# Patient Record
Sex: FEMALE | Race: WHITE | Employment: PART TIME | URBAN - METROPOLITAN AREA
[De-identification: names, ages, dates, MRNs, and addresses within clinical notes are randomized per-mention and may not be internally consistent; named-entity substitution may affect disease eponyms.]

---

## 2017-05-03 ENCOUNTER — HOSPITAL ENCOUNTER (OUTPATIENT)
Dept: SLEEP MEDICINE | Age: 57
Discharge: HOME | End: 2017-05-03
Payer: COMMERCIAL

## 2017-05-03 PROCEDURE — 95811 POLYSOM 6/>YRS CPAP 4/> PARM: CPT

## 2017-05-17 PROBLEM — E66.9 OBESITY (BMI 30-39.9): Status: ACTIVE | Noted: 2017-05-17

## 2017-05-17 PROBLEM — G47.33 OSA (OBSTRUCTIVE SLEEP APNEA): Status: ACTIVE | Noted: 2017-05-17

## 2017-05-17 PROBLEM — Z72.821 INADEQUATE SLEEP HYGIENE: Status: ACTIVE | Noted: 2017-05-17

## 2017-11-15 PROBLEM — G47.30 SLEEP APNEA WITH USE OF CONTINUOUS POSITIVE AIRWAY PRESSURE (CPAP): Status: ACTIVE | Noted: 2017-11-15

## 2018-06-04 ENCOUNTER — HOSPITAL ENCOUNTER (OUTPATIENT)
Dept: LAB | Age: 58
Discharge: HOME | End: 2018-06-04
Attending: NURSE PRACTITIONER
Payer: COMMERCIAL

## 2018-06-04 DIAGNOSIS — R60.9 FLUID RETENTION: ICD-10-CM

## 2018-06-04 PROBLEM — E66.01 SEVERE OBESITY (BMI 35.0-39.9): Status: ACTIVE | Noted: 2018-06-04

## 2018-06-04 LAB
ALBUMIN SERPL-MCNC: 3.9 G/DL (ref 3.5–5)
ALBUMIN/GLOB SERPL: 1.3 {RATIO}
ALP SERPL-CCNC: 103 U/L (ref 50–136)
ALT SERPL-CCNC: 36 U/L (ref 12–65)
ANION GAP SERPL CALC-SCNC: 6 MMOL/L
AST SERPL-CCNC: 28 U/L (ref 15–37)
BASOPHILS # BLD: 0 K/UL (ref 0–0.2)
BASOPHILS NFR BLD: 0 % (ref 0–2)
BILIRUB SERPL-MCNC: 0.4 MG/DL (ref 0.2–1.1)
BUN SERPL-MCNC: 14 MG/DL (ref 6–23)
CALCIUM SERPL-MCNC: 9.2 MG/DL (ref 8.3–10.4)
CHLORIDE SERPL-SCNC: 107 MMOL/L (ref 98–107)
CO2 SERPL-SCNC: 28 MMOL/L (ref 21–32)
CREAT SERPL-MCNC: 0.9 MG/DL (ref 0.6–1)
DIFFERENTIAL METHOD BLD: ABNORMAL
EOSINOPHIL # BLD: 0.1 K/UL (ref 0–0.8)
EOSINOPHIL NFR BLD: 3 % (ref 0.5–7.8)
ERYTHROCYTE [DISTWIDTH] IN BLOOD BY AUTOMATED COUNT: 12.2 % (ref 11.9–14.6)
GLOBULIN SER CALC-MCNC: 2.9 G/DL
GLUCOSE SERPL-MCNC: 117 MG/DL (ref 65–100)
HCT VFR BLD AUTO: 39.3 % (ref 35.8–46.3)
HGB BLD-MCNC: 13.8 G/DL (ref 11.7–15.4)
LYMPHOCYTES # BLD: 1.7 K/UL (ref 0.5–4.6)
LYMPHOCYTES NFR BLD: 33 % (ref 13–44)
MCH RBC QN AUTO: 32.4 PG (ref 26.1–32.9)
MCHC RBC AUTO-ENTMCNC: 35.1 G/DL (ref 31.4–35)
MCV RBC AUTO: 92.3 FL (ref 79.6–97.8)
MONOCYTES # BLD: 0.5 K/UL (ref 0.1–1.3)
MONOCYTES NFR BLD: 9 % (ref 4–12)
NEUTS SEG # BLD: 2.8 K/UL (ref 1.7–8.2)
NEUTS SEG NFR BLD: 55 % (ref 43–78)
PLATELET # BLD AUTO: 235 K/UL (ref 150–450)
PMV BLD AUTO: 9.3 FL (ref 10.8–14.1)
POTASSIUM SERPL-SCNC: 4.6 MMOL/L (ref 3.5–5.1)
PROT SERPL-MCNC: 6.8 G/DL (ref 6.3–8.2)
RBC # BLD AUTO: 4.26 M/UL (ref 4.05–5.25)
SODIUM SERPL-SCNC: 141 MMOL/L (ref 136–145)
TSH SERPL DL<=0.005 MIU/L-ACNC: 1.78 UIU/ML (ref 0.36–3.74)
WBC # BLD AUTO: 5.1 K/UL (ref 4.3–11.1)

## 2018-06-04 PROCEDURE — 80053 COMPREHEN METABOLIC PANEL: CPT | Performed by: NURSE PRACTITIONER

## 2018-06-04 PROCEDURE — 85025 COMPLETE CBC W/AUTO DIFF WBC: CPT | Performed by: NURSE PRACTITIONER

## 2018-06-04 PROCEDURE — 84443 ASSAY THYROID STIM HORMONE: CPT | Performed by: NURSE PRACTITIONER

## 2018-06-04 PROCEDURE — 36415 COLL VENOUS BLD VENIPUNCTURE: CPT | Performed by: NURSE PRACTITIONER

## 2018-08-15 ENCOUNTER — HOSPITAL ENCOUNTER (OUTPATIENT)
Dept: ULTRASOUND IMAGING | Age: 58
Discharge: HOME | End: 2018-08-15
Attending: NURSE PRACTITIONER
Payer: COMMERCIAL

## 2018-08-15 DIAGNOSIS — R03.0 ELEVATED BLOOD PRESSURE READING: ICD-10-CM

## 2018-08-15 DIAGNOSIS — R60.0 FLUID RETENTION IN LEGS: ICD-10-CM

## 2018-08-15 DIAGNOSIS — E66.9 OBESITY (BMI 30-39.9): ICD-10-CM

## 2018-08-15 PROCEDURE — 93880 EXTRACRANIAL BILAT STUDY: CPT

## 2018-12-11 ENCOUNTER — HOSPITAL ENCOUNTER (EMERGENCY)
Age: 58
Discharge: HOME | End: 2018-12-11
Attending: EMERGENCY MEDICINE
Payer: COMMERCIAL

## 2018-12-11 ENCOUNTER — APPOINTMENT (OUTPATIENT)
Dept: GENERAL RADIOLOGY | Age: 58
End: 2018-12-11
Payer: COMMERCIAL

## 2018-12-11 VITALS
HEART RATE: 79 BPM | WEIGHT: 215 LBS | TEMPERATURE: 98.3 F | HEIGHT: 63 IN | DIASTOLIC BLOOD PRESSURE: 74 MMHG | OXYGEN SATURATION: 96 % | RESPIRATION RATE: 20 BRPM | SYSTOLIC BLOOD PRESSURE: 151 MMHG | BODY MASS INDEX: 38.09 KG/M2

## 2018-12-11 DIAGNOSIS — S82.891A CLOSED FRACTURE OF RIGHT ANKLE, INITIAL ENCOUNTER: Primary | ICD-10-CM

## 2018-12-11 LAB — GLUCOSE BLD STRIP.AUTO-MCNC: 129 MG/DL (ref 65–100)

## 2018-12-11 PROCEDURE — 82962 GLUCOSE BLOOD TEST: CPT

## 2018-12-11 PROCEDURE — 99285 EMERGENCY DEPT VISIT HI MDM: CPT | Performed by: PHYSICIAN ASSISTANT

## 2018-12-11 PROCEDURE — 75810000053 HC SPLINT APPLICATION: Performed by: PHYSICIAN ASSISTANT

## 2018-12-11 PROCEDURE — 73610 X-RAY EXAM OF ANKLE: CPT

## 2018-12-11 PROCEDURE — 73562 X-RAY EXAM OF KNEE 3: CPT

## 2018-12-11 PROCEDURE — 73030 X-RAY EXAM OF SHOULDER: CPT

## 2018-12-11 PROCEDURE — 77030008298 HC SPLNT FBRGLS SCTCH 3M -A

## 2018-12-11 PROCEDURE — 74011250636 HC RX REV CODE- 250/636: Performed by: PHYSICIAN ASSISTANT

## 2018-12-11 PROCEDURE — 73080 X-RAY EXAM OF ELBOW: CPT

## 2018-12-11 PROCEDURE — 74011250637 HC RX REV CODE- 250/637: Performed by: PHYSICIAN ASSISTANT

## 2018-12-11 PROCEDURE — 96372 THER/PROPH/DIAG INJ SC/IM: CPT | Performed by: PHYSICIAN ASSISTANT

## 2018-12-11 RX ORDER — MORPHINE SULFATE 10 MG/ML
4 INJECTION, SOLUTION INTRAMUSCULAR; INTRAVENOUS
Status: COMPLETED | OUTPATIENT
Start: 2018-12-11 | End: 2018-12-11

## 2018-12-11 RX ORDER — OXYCODONE AND ACETAMINOPHEN 7.5; 325 MG/1; MG/1
1 TABLET ORAL
Qty: 20 TAB | Refills: 0 | Status: SHIPPED | OUTPATIENT
Start: 2018-12-11 | End: 2019-11-12 | Stop reason: ALTCHOICE

## 2018-12-11 RX ORDER — ONDANSETRON 4 MG/1
4 TABLET, ORALLY DISINTEGRATING ORAL
Status: COMPLETED | OUTPATIENT
Start: 2018-12-11 | End: 2018-12-11

## 2018-12-11 RX ORDER — OXYCODONE AND ACETAMINOPHEN 7.5; 325 MG/1; MG/1
1 TABLET ORAL
Status: DISCONTINUED | OUTPATIENT
Start: 2018-12-11 | End: 2018-12-11

## 2018-12-11 RX ORDER — KETOROLAC TROMETHAMINE 30 MG/ML
60 INJECTION, SOLUTION INTRAMUSCULAR; INTRAVENOUS
Status: COMPLETED | OUTPATIENT
Start: 2018-12-11 | End: 2018-12-11

## 2018-12-11 RX ADMIN — KETOROLAC TROMETHAMINE 60 MG: 30 INJECTION, SOLUTION INTRAMUSCULAR at 16:07

## 2018-12-11 RX ADMIN — MORPHINE SULFATE 4 MG: 10 INJECTION INTRAVENOUS at 16:08

## 2018-12-11 RX ADMIN — ONDANSETRON 4 MG: 4 TABLET, ORALLY DISINTEGRATING ORAL at 16:08

## 2018-12-11 NOTE — LETTER
[address] [facility] EMERGENCY DEPT 
[facility] [address] [facility] [address] 
[phone] Work/School Note Date: 12/11/2018 To Whom It May concern: 
 
Inez Lawson was seen and treated today in the emergency room by the following provider(s): 
Attending Provider: Joseph Rios MD 
Physician Assistant: LIZ Johnston.   
 
Inez Lawson may return to work after appointment with Ortho. Sincerely, LIZ Paredes

## 2018-12-11 NOTE — ED PROVIDER NOTES
Patient is here with her  today who brought her. She states that she slipped on some plastic pieces and a spare bedroom and fell on her right shoulder and right elbow. She decided to go to an [employer] because of that so she went to get pants on and she tripped and fell subsequently hyperextending her right knee and landing on her right ankle. She has been unable to bear weight since then. She is right-handed. She states that her sister is a nurse practitioner she had an appointment in 2 days to see her for some burning in her feet. She is not a diabetic. She did not hit her head nor did she have any loss of consciousness, visual changes, nausea, vomiting, chest pain, shortness of breath, abdominal pain, dizziness, weakness, dyspnea on exertion, orthopnea or other new symptoms. No trouble with urination or bowel movements. No open wounds. The history is provided by the patient and the spouse. Fall   The accident occurred 3 to 5 hours ago. The fall occurred while walking. She fell from a height of ground level. She landed on carpet. The point of impact was the right shoulder and right elbow (Right ankle). The pain is present in the right shoulder, right elbow and right knee (right ankle). The pain is at a severity of 8/10. The pain is moderate. She was not ambulatory at the scene. There was no entrapment after the fall. There was no drug use involved in the accident. There was no alcohol use involved in the accident. Pertinent negatives include no visual change, no fever, no numbness, no abdominal pain, no bowel incontinence, no nausea, no vomiting, no hematuria, no headaches, no extremity weakness, no hearing loss, no loss of consciousness, no tingling and no laceration. The symptoms are aggravated by activity, standing and ambulation. She has tried nothing for the symptoms.         Past Medical History:   Diagnosis Date    Elbow fracture, left     Lumbago     Multilevel degenerative disc disease     Sleep apnea     Unspecified constipation        Past Surgical History:   Procedure Laterality Date    HX LAP CHOLECYSTECTOMY      HX MYRINGOTOMY Bilateral     HX REFRACTIVE SURGERY Bilateral     HX RHINOPLASTY      HX LISA AND BSO      HX TONSIL AND ADENOIDECTOMY      HX TYMPANOSTOMY Left Dec '17    Choudhury- L t-tube         Family History:   Problem Relation Age of Onset    Hypertension Mother     Diabetes Mother     Cancer Father         lung    Anemia Sister        Social History     Socioeconomic History    Marital status:      Spouse name: Not on file    Number of children: Not on file    Years of education: Not on file    Highest education level: Not on file   Social Needs    Financial resource strain: Not on file    Food insecurity - worry: Not on file    Food insecurity - inability: Not on file   Cervilenz needs - medical: Not on file   Cervilenz needs - non-medical: Not on file   Occupational History    Not on file   Tobacco Use    Smoking status: Never Smoker    Smokeless tobacco: Never Used   Substance and Sexual Activity    Alcohol use: Yes     Alcohol/week: 0.0 oz     Comment: Social    Drug use: No    Sexual activity: Not on file   Other Topics Concern    Not on file   Social History Narrative    Not on file         ALLERGIES: Fire ant and Shellfish derived    Review of Systems   Constitutional: Negative. Negative for fever. HENT: Negative. Eyes: Negative. Respiratory: Negative. Cardiovascular: Negative. Gastrointestinal: Negative. Negative for abdominal pain, bowel incontinence, nausea and vomiting. Genitourinary: Negative. Negative for hematuria. Musculoskeletal: Negative. Negative for extremity weakness. Right shoulder, elbow, knee and ankle pain. Skin: Negative. Neurological: Negative. Negative for tingling, loss of consciousness, numbness and headaches. Psychiatric/Behavioral: Negative.     All other systems reviewed and are negative. Vitals:    12/11/18 1423 12/11/18 1427 12/11/18 1429   BP: 147/79 142/71    Pulse: 79     Resp: 20     Temp: 98 °F (36.7 °C)     SpO2: 98%  97%   Weight: 97.5 kg (215 lb)     Height: 5' 3\" (1.6 m)              Physical Exam   Constitutional: She is oriented to person, place, and time. She appears well-developed and well-nourished. HENT:   Head: Normocephalic and atraumatic. Right Ear: External ear normal.   Left Ear: External ear normal.   Nose: Nose normal.   Mouth/Throat: Oropharynx is clear and moist.   Eyes: Conjunctivae and EOM are normal. Pupils are equal, round, and reactive to light. Neck: Normal range of motion. Neck supple. Cardiovascular: Normal rate, regular rhythm, normal heart sounds and intact distal pulses. Pulmonary/Chest: Effort normal and breath sounds normal.   Abdominal: Soft. Bowel sounds are normal.   Musculoskeletal: Normal range of motion. Arms:       Legs:  Neurological: She is alert and oriented to person, place, and time. She has normal reflexes. Skin: Skin is warm and dry. No laceration noted. Psychiatric: She has a normal mood and affect. Her behavior is normal. Judgment and thought content normal.   Nursing note and vitals reviewed.        MDM  Number of Diagnoses or Management Options     Amount and/or Complexity of Data Reviewed  Tests in the radiology section of CPT®: ordered and reviewed    Risk of Complications, Morbidity, and/or Mortality  Presenting problems: moderate  Diagnostic procedures: moderate  Management options: moderate    Patient Progress  Patient progress: stable         APPLY SHORT LEG SPLINT  Date/Time: 12/11/2018 4:48 PM  Performed by: LIZ Brito  Authorized by: LIZ Brito     Consent:     Consent obtained:  Verbal (Splint done by panchito Davila)    Consent given by:  Patient    Risks discussed:  Discoloration, numbness, pain and swelling    Alternatives discussed:  No treatment  Pre-procedure details:     Sensation:  Normal    Skin color:  Normal  Procedure details:     Laterality:  Right    Location:  Ankle    Ankle:  R ankle    Cast type:  Short leg    Splint type: Ankle stirrup    Supplies:  Ortho-Glass  Post-procedure details:     Pain:  Improved    Sensation:  Normal    Patient tolerance of procedure: Tolerated well, no immediate complications          3:36 PM Spoke with Dr. Alex Martinez regarding patient, we discussed the patient's history, physical exam and x-ray findings. He would like her in a stirrup type splint, call the office tomorrow to make an appointment for tomorrow and to rest, ice elevate and no weightbearing. He would like her to have a prescription for a walker. I will also send her with pain medicine. She should use ice to the other joints that are bothering her and return to the ED if worsening in any way. The patient was observed in the ED. Results Reviewed:  XR ANKLE RT MIN 3 V   Final Result   IMPRESSION: There is an oblique fracture of the distal fibula which extends from   the tibiotalar joint superiorly. Ankle mortise is disrupted; the tibia is   displaced medially with respect to the talus. No talar tibial fracture   appreciated. There is moderate soft tissue swelling. Calcaneal spurs are   present. XR SHOULDER RT AP/LAT MIN 2 V   Final Result   IMPRESSION: Negative for acute fracture. XR ELBOW RT MIN 3 V   Final Result   IMPRESSION: Negative for acute fracture. XR KNEE RT 3 V   Final Result   IMPRESSION: Negative for acute fracture. POC glucose was 129 mg/dl. Last po intake was 8 am this morning. Patient counseled on elevated blood sugar and to follow up with her PCP as scheduled in two days. She needs a fasting blood sugar and hemoglobin A1c. We did talk about weight loss and low glycemic diet. Patient and  expressed understanding.   I discussed the results of all labs, procedures, radiographs, and treatments with the patient and available family. Treatment plan is agreed upon and the patient is ready for discharge. All voiced understanding of the discharge plan and medication instructions or changes as appropriate. Questions about treatment in the ED were answered. All were encouraged to return should symptoms worsen or new problems develop.

## 2018-12-11 NOTE — ED NOTES
I have reviewed discharge instructions with the patient. The patient verbalized understanding. Patient left ED via Discharge Method: wheelchair to Home with family. Opportunity for questions and clarification provided. Patient given 2 scripts. To continue your aftercare when you leave the hospital, you may receive an automated call from our care team to check in on how you are doing. This is a free service and part of our promise to provide the best care and service to meet your aftercare needs.  If you have questions, or wish to unsubscribe from this service please call [phone]. Thank you for Choosing our [facility] [employer] [facility].

## 2018-12-11 NOTE — DISCHARGE INSTRUCTIONS
Caring for Your Splint: After Your Visit  Your Care Instructions     A splint protects a broken bone or other injury. If you have a removable splint, follow your doctor's instructions and only remove the splint if your doctor says you can. Most splints can be adjusted. Your doctor will show you how to do this and will tell you when you might need to adjust the splint. Many splints are premade. Your doctor may also make a splint from plaster or fiberglass. Some splints have a built-in air cushion. Air pads are inflated to hold the injured area in place. Follow-up care is a key part of your treatment and safety. Be sure to make and go to all appointments, and call your doctor if you are having problems. It's also a good idea to know your test results and keep a list of the medicines you take. How can you care for yourself at home? General care  · Don't put any weight on a splint. If you have a walking boot, your doctor will tell you when you can put weight on it. · If the fingers or toes on the limb with the splint were not injured, wiggle them every now and then. This helps move the blood and fluids in the injured limb. · Prop up the injured arm or leg on a pillow when you ice it or anytime you sit or lie down during the next 3 days. Try to keep it above the level of your heart. This will help reduce swelling. · Put ice or cold packs on the hurt area for 10 to 20 minutes at a time. Try to do this every 1 to 2 hours for the next 3 days (when you are awake) or until the swelling goes down. Be careful not to get the splint wet. · Be safe with medicines. Read and follow all instructions on the label. ¨ If the doctor gave you a prescription medicine for pain, take it as prescribed. ¨ If you are not taking a prescription pain medicine, ask your doctor if you can take an over-the-counter medicine. · Keep up your muscle strength and tone as much as you can while protecting your injured limb or joint.  Your doctor may want you to tense and relax the muscles protected by the splint. Check with your doctor or physical therapist for instructions. Splint and skin care  · If your splint is not to be removed, try blowing cool air from a hair dryer or fan into the splint to help relieve itching. Never stick items under your splint to scratch the skin. · Do not use oils or lotions near your splint. If the skin becomes red or sore around the edge of the splint, you may pad the edges with a soft material, such as moleskin, or use tape to cover the edges. · If you're allowed to take your splint off, be sure your skin is dry before you put it back on. · Watch for pressure sores. These can develop over bony areas. Symptoms include a warm spot under the cast, pain, drainage, or an odor. Call your doctor if you think you have a pressure sore. · Watch for compartment syndrome. This happens when pressure builds up in a group of muscles, nerves, and blood vessels. It is an emergency. Symptoms include severe pain or tingling or numbness. Water and your splint  · Keep your splint dry. Moisture can collect under the splint and cause skin irritation and itching. If you have a wound or have had surgery, moisture under the splint can increase the risk of infection. · Cover your splint with at least two layers of plastic when you take a shower or bath, unless your doctor said you can take it off while bathing. · If you can take the splint off when you bathe, pat the area dry after bathing and put the splint back on.  · If your splint gets a little wet, you can dry it with a hair dryer. Use a \"cool\" setting. When should you call for help? Call your doctor now or seek immediate medical care if:  · You have increased or severe pain. · You feel a warm or painful spot under the splint. · You have problems with your splint. For example:  ¨ The skin under the splint burns or stings. ¨ The splint feels too tight.   ¨ There is a lot of swelling near the splint. (Some swelling is normal.)  ¨ You have a new fever. ¨ There is drainage or a bad smell coming from the splint. · Your foot or hand is cool or pale or changes color. · You have trouble moving your fingers or toes. · You have symptoms of a blood clot in your arm or leg (called a deep vein thrombosis). These may include:  ¨ Pain in the arm, calf, back of the knee, thigh, or groin. ¨ Redness and swelling in the arm, leg, or groin. Watch closely for changes in your health, and be sure to contact your doctor if:  · The splint is breaking apart or losing its shape. · You are not getting better as expected. Where can you learn more? Go to Dynamic Signal.be  Enter M757 in the search box to learn more about \"Caring for Your Splint: After Your Visit. \"   © 2282-8582 Healthwise, [employer]. Care instructions adapted under license by [facility] [employer] (which disclaims liability or warranty for this information). This care instruction is for use with your licensed healthcare professional. If you have questions about a medical condition or this instruction, always ask your healthcare professional. [place] [address] any warranty or liability for your use of this information. Content Version: 00.6.093868; Current as of: June 4, 2014            Broken Ankle: Care Instructions  Your Care Instructions    An ankle may break (fracture) during sports, a fall, or other accidents. Fractures can range from a small, hairline crack, to a bone or bones broken into two or more pieces. Your treatment depends on how bad the break is. Your doctor may have put your ankle in a splint or cast to allow it to heal or to keep it stable until you see another doctor. It may take weeks or months for your ankle to heal. You can help your ankle heal with some care at home. You heal best when you take good care of yourself. Eat a variety of healthy foods, and don't smoke.   You may have had a sedative to help you relax. You may be unsteady after having sedation. It can take a few hours for the medicine's effects to wear off. Common side effects of sedation include nausea, vomiting, and feeling sleepy or tired. The doctor has checked you carefully, but problems can develop later. If you notice any problems or new symptoms,  get medical treatment right away. Follow-up care is a key part of your treatment and safety. Be sure to make and go to all appointments, and call your doctor if you are having problems. It's also a good idea to know your test results and keep a list of the medicines you take. How can you care for yourself at home? · If the doctor gave you a sedative:  ? For 24 hours, don't do anything that requires attention to detail. It takes time for the medicine's effects to completely wear off.  ? For your safety, do not drive or operate any machinery that could be dangerous. Wait until the medicine wears off and you can think clearly and react easily. · Put ice or a cold pack on your ankle for 10 to 20 minutes at a time. Try to do this every 1 to 2 hours for the next 3 days (when you are awake). Put a thin cloth between the ice and your cast or splint. Keep your cast or splint dry. · Follow the cast care instructions your doctor gives you. If you have a splint, do not take it off unless your doctor tells you to. · Be safe with medicines. Take pain medicines exactly as directed. ? If the doctor gave you a prescription medicine for pain, take it as prescribed. ? If you are not taking a prescription pain medicine, ask your doctor if you can take an over-the-counter medicine. · Prop up your leg on pillows in the first few days after the injury. Keep the ankle higher than the level of your heart. This will help reduce swelling. · Do not put weight on your ankle unless your doctor tells you to. Use crutches to walk. · Follow instructions for exercises to keep your leg strong.   · Wiggle your toes often to reduce swelling and stiffness. When should you call for help? Call 911 anytime you think you may need emergency care. For example, call if:    · You have chest pain, are short of breath, or you cough up blood.     · You are very sleepy and you have trouble waking up.    Call your doctor now or seek immediate medical care if:    · You have new or worse nausea or vomiting.     · You have new or worse pain.     · Your foot is cool or pale or changes color.     · You have tingling, weakness, or numbness in your toes.     · Your cast or splint feels too tight.     · You have signs of a blood clot in your leg (called a deep vein thrombosis), such as:  ? Pain in your calf, back of the knee, thigh, or groin. ? Redness or swelling in your leg.    Watch closely for changes in your health, and be sure to contact your doctor if:    · You have a problem with your splint or cast.     · You do not get better as expected. Where can you learn more? Go to http://yisel-armen.info/. Enter P763 in the search box to learn more about \"Broken Ankle: Care Instructions. \"  Current as of: November 29, 2017  Content Version: 11.8  © 2133-3580 Healthwise, [employer]. Care instructions adapted under license by [employer] (which disclaims liability or warranty for this information). If you have questions about a medical condition or this instruction, always ask your healthcare professional. [place] [address] any warranty or liability for your use of this information.

## 2018-12-11 NOTE — ED TRIAGE NOTES
Pain in right ankle with swelling and some bruising. Pain also in right shoulder after fall x2 today. Denies LOC. Also states she hurt elbow during the fall.

## 2019-09-18 PROBLEM — I10 ESSENTIAL HYPERTENSION: Status: ACTIVE | Noted: 2019-09-18

## 2020-11-16 PROBLEM — G89.29 CHRONIC BILATERAL LOW BACK PAIN WITHOUT SCIATICA: Status: ACTIVE | Noted: 2020-11-16

## 2020-11-16 PROBLEM — K21.9 GASTROESOPHAGEAL REFLUX DISEASE WITHOUT ESOPHAGITIS: Status: ACTIVE | Noted: 2020-11-16

## 2020-11-16 PROBLEM — M54.50 CHRONIC BILATERAL LOW BACK PAIN WITHOUT SCIATICA: Status: ACTIVE | Noted: 2020-11-16

## 2020-11-16 PROBLEM — E66.01 MORBID OBESITY WITH BMI OF 40.0-44.9, ADULT (HCC): Status: ACTIVE | Noted: 2020-11-16

## 2020-11-18 ENCOUNTER — HOSPITAL ENCOUNTER (OUTPATIENT)
Dept: GENERAL RADIOLOGY | Age: 60
Discharge: HOME | End: 2020-11-18
Attending: INTERNAL MEDICINE
Payer: COMMERCIAL

## 2020-11-18 DIAGNOSIS — R05.9 COUGH: ICD-10-CM

## 2020-11-18 DIAGNOSIS — R07.89 CHEST PRESSURE: ICD-10-CM

## 2020-11-18 DIAGNOSIS — K21.9 GASTROESOPHAGEAL REFLUX DISEASE WITHOUT ESOPHAGITIS: ICD-10-CM

## 2020-11-18 PROCEDURE — 74011000255 HC RX REV CODE- 255: Performed by: INTERNAL MEDICINE

## 2020-11-18 PROCEDURE — 74011000250 HC RX REV CODE- 250: Performed by: INTERNAL MEDICINE

## 2020-11-18 PROCEDURE — 74246 X-RAY XM UPR GI TRC 2CNTRST: CPT

## 2020-11-18 RX ADMIN — ANTACID/ANTIFLATULENT 4 G: 380; 550; 10; 10 GRANULE, EFFERVESCENT ORAL at 10:15

## 2020-11-18 RX ADMIN — BARIUM SULFATE 135 ML: 980 POWDER, FOR SUSPENSION ORAL at 10:15

## 2020-11-18 RX ADMIN — BARIUM SULFATE 700 MG: 700 TABLET ORAL at 10:21

## 2020-11-18 NOTE — PROGRESS NOTES
Has reflux noted on UGI so needs to work on diet and weight loss  Would recommend stopping Prilosec and start Protonix 40mg daily #90 with 2 RF  Copy to pt via [employer]

## 2020-11-19 ENCOUNTER — HOSPITAL ENCOUNTER (OUTPATIENT)
Dept: GENERAL RADIOLOGY | Age: 60
Discharge: HOME | End: 2020-11-19
Payer: COMMERCIAL

## 2020-11-19 DIAGNOSIS — R05.9 COUGH: ICD-10-CM

## 2020-11-19 PROCEDURE — 71046 X-RAY EXAM CHEST 2 VIEWS: CPT

## 2021-03-09 ENCOUNTER — HOSPITAL ENCOUNTER (OUTPATIENT)
Dept: MAMMOGRAPHY | Age: 61
Discharge: HOME | End: 2021-03-09
Attending: NURSE PRACTITIONER

## 2021-03-09 DIAGNOSIS — Z12.31 VISIT FOR SCREENING MAMMOGRAM: ICD-10-CM

## 2021-04-13 PROBLEM — H90.72 MIXED CONDUCTIVE AND SENSORINEURAL HEARING LOSS OF LEFT EAR WITH UNRESTRICTED HEARING OF RIGHT EAR: Status: ACTIVE | Noted: 2021-01-05

## 2022-03-18 PROBLEM — H90.72 MIXED CONDUCTIVE AND SENSORINEURAL HEARING LOSS OF LEFT EAR WITH UNRESTRICTED HEARING OF RIGHT EAR: Status: ACTIVE | Noted: 2021-01-05

## 2022-03-18 PROBLEM — Z72.821 INADEQUATE SLEEP HYGIENE: Status: ACTIVE | Noted: 2017-05-17

## 2022-03-19 PROBLEM — M54.50 CHRONIC BILATERAL LOW BACK PAIN WITHOUT SCIATICA: Status: ACTIVE | Noted: 2020-11-16

## 2022-03-19 PROBLEM — E66.01 SEVERE OBESITY WITH BODY MASS INDEX (BMI) OF 35.0 TO 39.9 WITH SERIOUS COMORBIDITY (HCC): Status: ACTIVE | Noted: 2018-06-04

## 2022-03-19 PROBLEM — E66.9 OBESITY (BMI 30-39.9): Status: ACTIVE | Noted: 2017-05-17

## 2022-03-19 PROBLEM — G89.29 CHRONIC BILATERAL LOW BACK PAIN WITHOUT SCIATICA: Status: ACTIVE | Noted: 2020-11-16

## 2022-03-19 PROBLEM — I10 ESSENTIAL HYPERTENSION: Status: ACTIVE | Noted: 2019-09-18

## 2022-03-19 PROBLEM — E66.01 MORBID OBESITY WITH BMI OF 40.0-44.9, ADULT (HCC): Status: ACTIVE | Noted: 2020-11-16

## 2022-03-20 PROBLEM — K21.9 GASTROESOPHAGEAL REFLUX DISEASE WITHOUT ESOPHAGITIS: Status: ACTIVE | Noted: 2020-11-16

## 2022-03-20 PROBLEM — G47.30 SLEEP APNEA WITH USE OF CONTINUOUS POSITIVE AIRWAY PRESSURE (CPAP): Status: ACTIVE | Noted: 2017-11-15

## 2022-03-20 PROBLEM — G47.33 OSA (OBSTRUCTIVE SLEEP APNEA): Status: ACTIVE | Noted: 2017-05-17

## 2022-05-02 ENCOUNTER — TRANSCRIBE ORDER (OUTPATIENT)
Dept: SCHEDULING | Age: 62
End: 2022-05-02

## 2022-05-02 DIAGNOSIS — Z12.31 SCREENING MAMMOGRAM FOR HIGH-RISK PATIENT: Primary | ICD-10-CM

## 2022-05-19 ENCOUNTER — HOSPITAL ENCOUNTER (OUTPATIENT)
Dept: MAMMOGRAPHY | Age: 62
Discharge: HOME | End: 2022-05-19
Attending: NURSE PRACTITIONER
Payer: COMMERCIAL

## 2022-05-19 DIAGNOSIS — Z12.31 SCREENING MAMMOGRAM FOR HIGH-RISK PATIENT: ICD-10-CM

## 2022-05-19 PROCEDURE — 77063 BREAST TOMOSYNTHESIS BI: CPT

## 2022-06-28 PROCEDURE — 88312 SPECIAL STAINS GROUP 1: CPT

## 2022-06-28 PROCEDURE — 88305 TISSUE EXAM BY PATHOLOGIST: CPT

## 2022-06-29 ENCOUNTER — HOSPITAL ENCOUNTER (OUTPATIENT)
Dept: LAB | Age: 62
Discharge: HOME | End: 2022-07-02

## 2022-09-07 ENCOUNTER — NURSE ONLY (OUTPATIENT)
Dept: INTERNAL MEDICINE CLINIC | Facility: CLINIC | Age: 62
End: 2022-09-07
Payer: COMMERCIAL

## 2022-09-07 DIAGNOSIS — G47.33 OSA ON CPAP: ICD-10-CM

## 2022-09-07 DIAGNOSIS — G47.9 SLEEP DISTURBANCE: Primary | ICD-10-CM

## 2022-09-07 DIAGNOSIS — H91.90 PERCEIVED HEARING CHANGES: ICD-10-CM

## 2022-09-07 DIAGNOSIS — F41.9 ANXIETY: ICD-10-CM

## 2022-09-07 DIAGNOSIS — Z99.89 OSA ON CPAP: ICD-10-CM

## 2022-09-07 PROCEDURE — 92552 PURE TONE AUDIOMETRY AIR: CPT | Performed by: NURSE PRACTITIONER

## 2022-09-07 PROCEDURE — 99212 OFFICE O/P EST SF 10 MIN: CPT | Performed by: NURSE PRACTITIONER

## 2022-09-08 RX ORDER — TRAZODONE HYDROCHLORIDE 50 MG/1
25-50 TABLET ORAL NIGHTLY
Qty: 30 TABLET | Refills: 1 | Status: SHIPPED | OUTPATIENT
Start: 2022-09-08 | End: 2022-10-30 | Stop reason: DRUGHIGH
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
## 2022-09-28 ENCOUNTER — TELEPHONE (OUTPATIENT)
Dept: INTERNAL MEDICINE CLINIC | Facility: CLINIC | Age: 62
End: 2022-09-28

## 2022-09-28 DIAGNOSIS — F41.9 ANXIETY: Primary | ICD-10-CM

## 2022-09-28 DIAGNOSIS — H91.90 PERCEIVED HEARING CHANGES: ICD-10-CM

## 2022-09-28 DIAGNOSIS — N39.46 MIXED STRESS AND URGE URINARY INCONTINENCE: ICD-10-CM

## 2022-09-28 DIAGNOSIS — G47.9 SLEEP DISTURBANCE: ICD-10-CM

## 2022-09-28 NOTE — TELEPHONE ENCOUNTER
Pt has seen ENT; auditory music/sounds she is hearing was confirmed to not be ENT related. Trazodone has improved symptoms yet psychiatry consult was requested. Additionally, having persistent urinary incontinence. Orders Placed This Encounter   Procedures    Brady Pool MD, Psychiatry, [place]     Referral Priority:   Routine     Referral Type:   Eval and Treat     Referral Reason:   Specialty Services Required     Referred to Provider:   Awilda Singh MD     Requested Specialty:   Psychiatry     Number of Visits Requested:   1    [address], Delton Essex, DO, Urogynecology, [place]     Referral Priority:   Routine     Referral Type:   Eval and Treat     Referral Reason:   Specialty Services Required     Requested Specialty:   Obstetrics & Gynecology     Number of Visits Requested:   1     Referral to psychiatry and urogynecology.     Sachin Nathan NP, APRN - CNP

## 2022-10-04 ENCOUNTER — HOSPITAL ENCOUNTER (OUTPATIENT)
Dept: CT IMAGING | Age: 62
Discharge: HOME | End: 2022-10-06
Payer: COMMERCIAL

## 2022-10-04 ENCOUNTER — TELEPHONE (OUTPATIENT)
Dept: INTERNAL MEDICINE CLINIC | Facility: CLINIC | Age: 62
End: 2022-10-04

## 2022-10-04 DIAGNOSIS — R44.0 AUDITORY HALLUCINATIONS: ICD-10-CM

## 2022-10-04 DIAGNOSIS — F41.9 ANXIETY: ICD-10-CM

## 2022-10-04 DIAGNOSIS — F41.9 ANXIETY: Primary | ICD-10-CM

## 2022-10-04 DIAGNOSIS — G47.9 SLEEP DISTURBANCE: ICD-10-CM

## 2022-10-04 PROCEDURE — 70450 CT HEAD/BRAIN W/O DYE: CPT

## 2022-10-12 ENCOUNTER — OFFICE VISIT (OUTPATIENT)
Dept: INTERNAL MEDICINE CLINIC | Facility: CLINIC | Age: 62
End: 2022-10-12
Payer: COMMERCIAL

## 2022-10-12 VITALS
OXYGEN SATURATION: 99 % | SYSTOLIC BLOOD PRESSURE: 122 MMHG | DIASTOLIC BLOOD PRESSURE: 84 MMHG | HEART RATE: 68 BPM | WEIGHT: 211.4 LBS | RESPIRATION RATE: 16 BRPM

## 2022-10-12 DIAGNOSIS — G47.33 OSA (OBSTRUCTIVE SLEEP APNEA): ICD-10-CM

## 2022-10-12 DIAGNOSIS — K21.9 GASTROESOPHAGEAL REFLUX DISEASE WITHOUT ESOPHAGITIS: ICD-10-CM

## 2022-10-12 DIAGNOSIS — E74.39 GLUCOSE INTOLERANCE: ICD-10-CM

## 2022-10-12 DIAGNOSIS — N39.41 URGE INCONTINENCE: ICD-10-CM

## 2022-10-12 DIAGNOSIS — I10 ESSENTIAL HYPERTENSION: ICD-10-CM

## 2022-10-12 DIAGNOSIS — J32.9 CHRONIC SINUSITIS, UNSPECIFIED LOCATION: ICD-10-CM

## 2022-10-12 DIAGNOSIS — R44.0 AUDITORY HALLUCINATIONS: Primary | ICD-10-CM

## 2022-10-12 DIAGNOSIS — H90.72 MIXED CONDUCTIVE AND SENSORINEURAL HEARING LOSS OF LEFT EAR WITH UNRESTRICTED HEARING OF RIGHT EAR: ICD-10-CM

## 2022-10-12 DIAGNOSIS — R44.0 AUDITORY HALLUCINATIONS: ICD-10-CM

## 2022-10-12 LAB
25(OH)D3 SERPL-MCNC: 16 NG/ML (ref 30–100)
ALBUMIN SERPL-MCNC: 4 G/DL (ref 3.2–4.6)
ALBUMIN/GLOB SERPL: 1.3 {RATIO} (ref 0.4–1.6)
ALP SERPL-CCNC: 132 U/L (ref 50–136)
ALT SERPL-CCNC: 24 U/L (ref 12–65)
ANION GAP SERPL CALC-SCNC: 4 MMOL/L (ref 2–11)
AST SERPL-CCNC: 14 U/L (ref 15–37)
BASOPHILS # BLD: 0 K/UL (ref 0–0.2)
BASOPHILS NFR BLD: 0 % (ref 0–2)
BILIRUB SERPL-MCNC: 0.4 MG/DL (ref 0.2–1.1)
BILIRUBIN, URINE, POC: NEGATIVE
BLOOD URINE, POC: NEGATIVE
BUN SERPL-MCNC: 14 MG/DL (ref 8–23)
CALCIUM SERPL-MCNC: 9.7 MG/DL (ref 8.3–10.4)
CHLORIDE SERPL-SCNC: 107 MMOL/L (ref 101–110)
CO2 SERPL-SCNC: 27 MMOL/L (ref 21–32)
CREAT SERPL-MCNC: 0.9 MG/DL (ref 0.6–1)
DIFFERENTIAL METHOD BLD: NORMAL
EOSINOPHIL # BLD: 0.1 K/UL (ref 0–0.8)
EOSINOPHIL NFR BLD: 2 % (ref 0.5–7.8)
ERYTHROCYTE [DISTWIDTH] IN BLOOD BY AUTOMATED COUNT: 12.7 % (ref 11.9–14.6)
EST. AVERAGE GLUCOSE BLD GHB EST-MCNC: 111 MG/DL
GLOBULIN SER CALC-MCNC: 3.2 G/DL (ref 2.8–4.5)
GLUCOSE SERPL-MCNC: 115 MG/DL (ref 65–100)
GLUCOSE URINE, POC: NEGATIVE
HBA1C MFR BLD: 5.5 % (ref 4.8–5.6)
HCT VFR BLD AUTO: 42.4 % (ref 35.8–46.3)
HGB BLD-MCNC: 14.1 G/DL (ref 11.7–15.4)
IMM GRANULOCYTES # BLD AUTO: 0 K/UL (ref 0–0.5)
IMM GRANULOCYTES NFR BLD AUTO: 1 % (ref 0–5)
KETONES, URINE, POC: NEGATIVE
LEUKOCYTE ESTERASE, URINE, POC: NEGATIVE
LYMPHOCYTES # BLD: 1.4 K/UL (ref 0.5–4.6)
LYMPHOCYTES NFR BLD: 28 % (ref 13–44)
MCH RBC QN AUTO: 31.8 PG (ref 26.1–32.9)
MCHC RBC AUTO-ENTMCNC: 33.3 G/DL (ref 31.4–35)
MCV RBC AUTO: 95.5 FL (ref 82–102)
MONOCYTES # BLD: 0.4 K/UL (ref 0.1–1.3)
MONOCYTES NFR BLD: 7 % (ref 4–12)
NEUTS SEG # BLD: 3.2 K/UL (ref 1.7–8.2)
NEUTS SEG NFR BLD: 62 % (ref 43–78)
NITRITE, URINE, POC: NEGATIVE
NRBC # BLD: 0 K/UL (ref 0–0.2)
PH, URINE, POC: 7 (ref 4.6–8)
PLATELET # BLD AUTO: 254 K/UL (ref 150–450)
PMV BLD AUTO: 9.9 FL (ref 9.4–12.3)
POTASSIUM SERPL-SCNC: 4.5 MMOL/L (ref 3.5–5.1)
PROT SERPL-MCNC: 7.2 G/DL (ref 6.3–8.2)
PROTEIN,URINE, POC: NEGATIVE
RBC # BLD AUTO: 4.44 M/UL (ref 4.05–5.2)
SODIUM SERPL-SCNC: 138 MMOL/L (ref 133–143)
SPECIFIC GRAVITY, URINE, POC: 1.01 (ref 1–1.03)
TSH W FREE THYROID IF ABNORMAL: 2.41 UIU/ML (ref 0.36–3.74)
URINALYSIS CLARITY, POC: CLEAR
URINALYSIS COLOR, POC: YELLOW
UROBILINOGEN, POC: 0.2
VIT B12 SERPL-MCNC: 375 PG/ML (ref 193–986)
WBC # BLD AUTO: 5.1 K/UL (ref 4.3–11.1)

## 2022-10-12 PROCEDURE — 99214 OFFICE O/P EST MOD 30 MIN: CPT | Performed by: NURSE PRACTITIONER

## 2022-10-12 PROCEDURE — 81003 URINALYSIS AUTO W/O SCOPE: CPT | Performed by: NURSE PRACTITIONER

## 2022-10-12 RX ORDER — CEFUROXIME AXETIL 250 MG/1
250 TABLET ORAL 2 TIMES DAILY
Qty: 28 TABLET | Refills: 0 | Status: SHIPPED | OUTPATIENT
Start: 2022-10-12 | End: 2022-10-26

## 2022-10-12 ASSESSMENT — PATIENT HEALTH QUESTIONNAIRE - PHQ9
SUM OF ALL RESPONSES TO PHQ QUESTIONS 1-9: 2
1. LITTLE INTEREST OR PLEASURE IN DOING THINGS: 1
SUM OF ALL RESPONSES TO PHQ QUESTIONS 1-9: 2
SUM OF ALL RESPONSES TO PHQ QUESTIONS 1-9: 2
SUM OF ALL RESPONSES TO PHQ9 QUESTIONS 1 & 2: 2
2. FEELING DOWN, DEPRESSED OR HOPELESS: 1
SUM OF ALL RESPONSES TO PHQ QUESTIONS 1-9: 2

## 2022-10-12 NOTE — PROGRESS NOTES
nAtonio Barrios (: 1960) presents today c/o persistent auditory hallucinations. They started about 4 months ago. No known cause. She is under stress at work. She is employed by her father as an  with [employer] 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 Denies supplement or drug use. She drinks alcohol moderately. She has been following a keto diet for several months. She presented a few weeks ago with similar complaint and had concerns regarding her hearing as she has had multiple ear surgeries. Hearing was checked and she was started on trazodone Qhs, which has helped minimally. She states the voices are normally a male voice; sometimes a female crying in the background. She also hears lots of music. She states the male voice threatens to kill her, her  and her family. She has no weapons in the home and denies suicidal ideations. Head CT 10/04/2022: unremarkable other than chronic sinus changes. Of note, she is scheduled to see psychiatry tomorrow morning. She has no hx/o of psychosis, bipolar disorder, anxiety or depression. She has hx/o of chronic constipation, HTN and sleep apnea. She is compliant with nightly CPAP. Chief Complaint   Patient presents with    Hallucinations     Auditory hallucinations. Reviewed and updated this visit by provider:  Tobacco  Allergies  Meds  Problems  Med Hx  Surg Hx  Fam Hx       Immunizations:  Immunization status: up to date and documented.     Review of Systems - Negative except as stated in HPI  History obtained from chart review and the patient  General ROS: positive for  - sleep disturbance  negative for - fever or night sweats  Psychological ROS: positive for - anxiety, depression, hallucinations, irritability, memory difficulties, and sleep disturbances  negative for - disorientation, physical abuse, sexual abuse, or suicidal ideation  Respiratory ROS: no cough, shortness of breath, or wheezing  Cardiovascular ROS: no chest pain or dyspnea on exertion  Gastrointestinal ROS: no abdominal pain, change in bowel habits, or black or bloody stools  Genito-Urinary ROS: no dysuria, trouble voiding, or hematuria    Visit Vitals  /84 (Site: Left Upper Arm, Position: Sitting)   Pulse 68   Resp 16   Wt 211 lb 6.4 oz (95.9 kg)   SpO2 99%     Physical Examination: General appearance - alert, well appearing, and in no distress, oriented to person, place, and time, and overweight  Mental status - alert, oriented to person, place, and time, normal mood, behavior, speech, dress, motor activity, and thought processes, intermittently tearful; anxious  Ears - right ear normal, left ear canal with scarring; eustachian tube intact  Nose - normal and patent, no erythema, discharge or polyps  Mouth - mucous membranes moist, pharynx normal without lesions  Neck - supple, no significant adenopathy, thyroid exam: thyroid is normal in size without nodules or tenderness  Chest - clear to auscultation, no wheezes, rales or rhonchi, symmetric air entry  Heart - normal rate, regular rhythm, normal S1, S2, no murmurs, rubs, clicks or gallops  Extremities - peripheral pulses normal, no pedal edema, no clubbing or cyanosis    CT Result (most recent):  CT HEAD WO CONTRAST 10/04/2022    Narrative  HEAD CT WITHOUT CONTRAST  10/4/2022    HISTORY:   Anxiety disorder, unspecified; Sleep disorder, unspecified; Auditory  hallucinations    TECHNIQUE: Noncontrast axial images were obtained through the brain. All CT  scans at this facility used dose modulation, interactive reconstruction and/or  weight based dosing when appropriate to reduce radiation dose to as low as  reasonably achievable. COMPARISON: None    FINDINGS: There is no acute intracranial hemorrhage, significant mass effect or  CT evidence of acute large artery territorial infarction.  Please note that a  hyperacute infarct or small vessel infarct may not be apparent on initial CT  imaging. Mild mucosal thickening is present in the right maxillary sinus and sphenoid  sinuses. .    There is no hydrocephalus , intra-axial mass or abnormal extra-axial fluid  collection. There are no displaced skull fractures. Impression  1. Mild paranasal sinus mucosal thickening. 2. No acute intracranial findings. Results for orders placed or performed in visit on 10/12/22   AMB POC URINALYSIS DIP STICK AUTO W/O MICRO   Result Value Ref Range    Color, Urine, POC yellow     Clarity, Urine, POC clear     Glucose, Urine, POC Negative Negative    Bilirubin, Urine, POC Negative Negative    Ketones, Urine, POC Negative Negative    Specific Gravity, Urine, POC 1.015 1.001 - 1.035    Blood, Urine, POC Negative Negative    pH, Urine, POC 7 4.6 - 8.0    Protein, Urine, POC Negative Negative    Urobilinogen, POC 0.2     Nitrate, Urine, POC Negative Negative    Leukocyte Esterase, Urine, POC Negative Negative     Assessment/Plan:  1. Auditory hallucinations    2. Chronic sinusitis, unspecified location    3. Essential hypertension    4. JOVANI (obstructive sleep apnea)    5. Gastroesophageal reflux disease without esophagitis    6. Mixed conductive and sensorineural hearing loss of left ear with unrestricted hearing of right ear    7. Urge incontinence    8. Glucose intolerance      Orders Placed This Encounter   Procedures    Culture, Urine     Order Specific Question:   Specify (ex-cath, midstream, cysto, etc)?      Answer:   midstream    Comprehensive Metabolic Panel     Standing Status:   Future     Number of Occurrences:   1     Standing Expiration Date:   10/12/2023    Hemoglobin A1C     Standing Status:   Future     Number of Occurrences:   1     Standing Expiration Date:   10/12/2023    CBC with Auto Differential     Standing Status:   Future     Number of Occurrences:   1     Standing Expiration Date:   10/12/2023    TSH with Reflex     Standing Status:   Future     Number of Occurrences:   1 Standing Expiration Date:   10/12/2023    Vitamin B12     Standing Status:   Future     Number of Occurrences:   1     Standing Expiration Date:   10/12/2023    Vitamin D 25 Hydroxy     Standing Status:   Future     Number of Occurrences:   1     Standing Expiration Date:   10/12/2023    AMB POC URINALYSIS DIP STICK AUTO W/O MICRO     Orders Placed This Encounter   Medications    cefUROXime (CEFTIN) 250 MG tablet     Sig: Take 1 tablet by mouth 2 times daily for 14 days     Dispense:  28 tablet     Refill:  0     Urine sent for culture and labs ordered - will contact with results. Ceftin as prescribed for sinusitis noted on head CT. Otherwise, continue current medications. Consult as scheduled with psychiatry tomorrow morning. Excellent social support; contracts for safety. Compliant with nightly CPAP. Follow up as scheduled with ENT in a couple weeks for repeat audiometry. Instructed to go directly to ER with any concerns for her safety or SI/HI. She is agreeable.     Paola Love, NP, APRN - CNP

## 2022-10-13 ENCOUNTER — OFFICE VISIT (OUTPATIENT)
Dept: BEHAVIORAL/MENTAL HEALTH CLINIC | Age: 62
End: 2022-10-13
Payer: COMMERCIAL

## 2022-10-13 VITALS
SYSTOLIC BLOOD PRESSURE: 128 MMHG | HEART RATE: 65 BPM | BODY MASS INDEX: 38.09 KG/M2 | HEIGHT: 62 IN | WEIGHT: 207 LBS | OXYGEN SATURATION: 97 % | DIASTOLIC BLOOD PRESSURE: 76 MMHG

## 2022-10-13 DIAGNOSIS — F20.81 SCHIZOPHRENIFORM DISORDER (HCC): Primary | ICD-10-CM

## 2022-10-13 PROBLEM — E55.9 VITAMIN D DEFICIENCY: Status: ACTIVE | Noted: 2022-10-13

## 2022-10-13 PROCEDURE — 90792 PSYCH DIAG EVAL W/MED SRVCS: CPT | Performed by: STUDENT IN AN ORGANIZED HEALTH CARE EDUCATION/TRAINING PROGRAM

## 2022-10-13 RX ORDER — ARIPIPRAZOLE 5 MG/1
5 TABLET ORAL NIGHTLY
Qty: 30 TABLET | Refills: 2 | Status: SHIPPED | OUTPATIENT
Start: 2022-10-13 | End: 2022-10-24

## 2022-10-13 ASSESSMENT — PATIENT HEALTH QUESTIONNAIRE - PHQ9
1. LITTLE INTEREST OR PLEASURE IN DOING THINGS: 0
SUM OF ALL RESPONSES TO PHQ QUESTIONS 1-9: 0
SUM OF ALL RESPONSES TO PHQ9 QUESTIONS 1 & 2: 0
SUM OF ALL RESPONSES TO PHQ QUESTIONS 1-9: 0
2. FEELING DOWN, DEPRESSED OR HOPELESS: 0

## 2022-10-13 ASSESSMENT — ANXIETY QUESTIONNAIRES
5. BEING SO RESTLESS THAT IT IS HARD TO SIT STILL: 3
7. FEELING AFRAID AS IF SOMETHING AWFUL MIGHT HAPPEN: 3
6. BECOMING EASILY ANNOYED OR IRRITABLE: 3
GAD7 TOTAL SCORE: 21
2. NOT BEING ABLE TO STOP OR CONTROL WORRYING: 3
IF YOU CHECKED OFF ANY PROBLEMS ON THIS QUESTIONNAIRE, HOW DIFFICULT HAVE THESE PROBLEMS MADE IT FOR YOU TO DO YOUR WORK, TAKE CARE OF THINGS AT HOME, OR GET ALONG WITH OTHER PEOPLE: VERY DIFFICULT
1. FEELING NERVOUS, ANXIOUS, OR ON EDGE: 3
4. TROUBLE RELAXING: 3
3. WORRYING TOO MUCH ABOUT DIFFERENT THINGS: 3

## 2022-10-15 LAB
BACTERIA SPEC CULT: NORMAL
SERVICE CMNT-IMP: NORMAL

## 2022-10-19 ENCOUNTER — PATIENT MESSAGE (OUTPATIENT)
Dept: BEHAVIORAL/MENTAL HEALTH CLINIC | Age: 62
End: 2022-10-19

## 2022-10-19 NOTE — TELEPHONE ENCOUNTER
Spoke with pt, who reports that she has continued to experience significant difficulty with AH of voices that have become progressively more violent, louder. States that it occurs 24/7, but she denies SI/HI and there are no acute concerns for safety at this time. Reports compliance with Abilify and denies significant SE. Due to above, discussed moving follow-up to sooner date and will increase Abilify to 10 mg QHS. Discussed safety planning in the event of worsening symptoms. Pt expressed understanding and agreement.     Balaji Paris MD  10/19/2022 10:11 AM

## 2022-10-19 NOTE — TELEPHONE ENCOUNTER
From: Alivia Granger  To: Dr. Gaetano Martinez: 10/19/2022 9:22 AM EDT  Subject: Re: Red Lea,  I hope you are having a wonderful week. I just wanted to reach out to you, to let you know that the voices that I hear are 24/7 Louder, and he is threatening to kill my parents, kids, grandkids. I am so stressed out and scare for not only MY life, but my family. He even talks about where they live in and tells me what street they live on. He tells me my mothers dead, and to call her. I am so stressed out about this. My  went away over the weekend, and I thought maybe everything would subside, but it just worse. I know it's not real, but I am doubting myself everyday as it's so violent. He hears what I think and mimics my thoughts. I am just so tired of it. One good thing I am retiring in January and know my stress level will come down, but I just can't deal with these voices. I am super snappy at my  and that is not me. I feel bad for him. I know that it takes a while for meds to kick in, but seriously, it's 24/7 screaming in my ear, singing in my ear, calling me fat, ugly and worthless. I am just tired. And so scared for myself and my family.     Angeline Robles

## 2022-10-24 ENCOUNTER — OFFICE VISIT (OUTPATIENT)
Dept: BEHAVIORAL/MENTAL HEALTH CLINIC | Age: 62
End: 2022-10-24
Payer: COMMERCIAL

## 2022-10-24 VITALS
OXYGEN SATURATION: 95 % | WEIGHT: 206.6 LBS | SYSTOLIC BLOOD PRESSURE: 130 MMHG | HEIGHT: 62 IN | BODY MASS INDEX: 38.02 KG/M2 | TEMPERATURE: 97.9 F | HEART RATE: 67 BPM | DIASTOLIC BLOOD PRESSURE: 60 MMHG

## 2022-10-24 DIAGNOSIS — F20.81 SCHIZOPHRENIFORM DISORDER (HCC): Primary | ICD-10-CM

## 2022-10-24 DIAGNOSIS — F41.9 ANXIETY DISORDER, UNSPECIFIED TYPE: ICD-10-CM

## 2022-10-24 PROCEDURE — 99214 OFFICE O/P EST MOD 30 MIN: CPT | Performed by: STUDENT IN AN ORGANIZED HEALTH CARE EDUCATION/TRAINING PROGRAM

## 2022-10-24 RX ORDER — ARIPIPRAZOLE 15 MG/1
15 TABLET ORAL NIGHTLY
Qty: 30 TABLET | Refills: 2 | Status: SHIPPED | OUTPATIENT
Start: 2022-10-24 | End: 2022-11-21 | Stop reason: ALTCHOICE

## 2022-10-24 RX ORDER — HYDROXYZINE HYDROCHLORIDE 10 MG/1
TABLET, FILM COATED ORAL
Qty: 90 TABLET | Refills: 2 | Status: SHIPPED | OUTPATIENT
Start: 2022-10-24

## 2022-10-24 ASSESSMENT — PATIENT HEALTH QUESTIONNAIRE - PHQ9
SUM OF ALL RESPONSES TO PHQ QUESTIONS 1-9: 2
1. LITTLE INTEREST OR PLEASURE IN DOING THINGS: 0
SUM OF ALL RESPONSES TO PHQ QUESTIONS 1-9: 2
SUM OF ALL RESPONSES TO PHQ9 QUESTIONS 1 & 2: 2
SUM OF ALL RESPONSES TO PHQ QUESTIONS 1-9: 2
SUM OF ALL RESPONSES TO PHQ QUESTIONS 1-9: 2
2. FEELING DOWN, DEPRESSED OR HOPELESS: 2

## 2022-10-24 ASSESSMENT — ANXIETY QUESTIONNAIRES
3. WORRYING TOO MUCH ABOUT DIFFERENT THINGS: 3
GAD7 TOTAL SCORE: 17
6. BECOMING EASILY ANNOYED OR IRRITABLE: 3
2. NOT BEING ABLE TO STOP OR CONTROL WORRYING: 2
7. FEELING AFRAID AS IF SOMETHING AWFUL MIGHT HAPPEN: 3
IF YOU CHECKED OFF ANY PROBLEMS ON THIS QUESTIONNAIRE, HOW DIFFICULT HAVE THESE PROBLEMS MADE IT FOR YOU TO DO YOUR WORK, TAKE CARE OF THINGS AT HOME, OR GET ALONG WITH OTHER PEOPLE: EXTREMELY DIFFICULT
1. FEELING NERVOUS, ANXIOUS, OR ON EDGE: 2
5. BEING SO RESTLESS THAT IT IS HARD TO SIT STILL: 1
4. TROUBLE RELAXING: 3

## 2022-10-24 NOTE — PROGRESS NOTES
Susie       Patient Name: Russell Brock    Patient : 1960    Patient MRN: [id number]    Insurance: [facility]    Primary Language: English      Date of Service: 10/24/2022    Type of Service: Medication management    Other Services Involved: N/A       Phone Number: [phone]   Emergency Contact: Alli Clement, , [phone]       Chief Complaint: \"At night it's quieter\"         History of Present Illness    Zach Mace is a 58 y.o. female with reported prior psychiatric history significant for recent-onset AH, paranoia who presents for medication management. They are currently prescribed: Abilify 10 mg QHS, Trazodone 50 mg QHS. Since last appt, pt reached out to report worsening symptoms and had no experienced any significant difference with initiation of Abilify. Due to reported tolerability, increased Abilify to 10 mg QHS last week. Pt reports that he AH have become \"quieter\" at night, but continues to struggle with sleep if she wakes up. Also reports that she has begun to experience anxiety attacks \"that are new for me,\" describing the sensation as \"everything feels like it's falling down on me, I can't concentrate, I'm going off on my . \" Reports that she will often wake up with more anxiety, stating that she will hear his voice \"yelling that he will kill me, where's my gun!?\"  She is looking forward to retiring in Dec, as this will likely reduce her stress. States that she was able to reach out to local contact for counseling and is hoping to start within the next few weeks. Denies SI/HI, paranoia or delusions. Last Visit (10/13/22): Pt reports that she started to hear neighbors fighting next door, expressing that \"it got louder and louder every night\" and I could hear them fighting, the lady calling for help. States that it was happening night after night, so she called the police to check on her.  However, the neighbors answered the door and said that they were sleeping. Ultimately, this continued to progress, but states that her  told her that he couldn't hear anything. She called the police one more time after another night of yelling, but the police didn't find anything again. States that this has now transitioned to yelling threats, stating that the male voice was threatening to kill the pt's dogs and the pt. Went to her PCP, who prescribed Trazodone due to concern of sleep deprivation, but this has not been helpful. Reports that she recently visited family in [place] [id number] and heard similar voices next door, but no one else could hear anything. The voices often involve death or violence. Denies any prior experiences of AH, but shares that she has a long-standing hx of hearing difficulties (had tubes placed a few years ago and required reconstruction of her hear drum). Also shares that she will often hear music at night as well. Pt reports that the only recent stressor is being under significant stress from work. Psychiatric Review of Systems    Depression: Denies depressed mood and anhedonia. Denies prior hx of depression. Denies current or prior SI/HI. Anxiety: excessive anxiety and worry, feelings of restlessness, difficulty concentrating, irritability, and sleep disturbance    Hypomania/carey: Denies inflated self-esteem or grandiosity, decreased need for sleep, more talkative or pressured speech, flight of ideas or racing thoughts, distractibility, and increased goal-directed activity    Psychosis: Reports recent onset of AH, paranoia of being followed since Aug '22, stating that she has started closing the blinds, \"being afraid of even leaving my driveway. \" Expresses that the sensations as \"weird\" and is distressed by these symptoms.     Trauma: Denies  re-experiencing, avoidance behaviors, hypervigilance or reactivity, negative self-concept, and affect dysregulation         Psychiatric History    Please see old records. New medication trials: Abilify       Family History    Please see old records. No updates at this time. Social History      Please see old records. No updates at this time. Substance Abuse History      Please see old records. No updates at this time. Past Medical History:    Past Medical History:   Diagnosis Date    Elbow fracture, left     Eye exam, routine 01/30/2020    Dr. Ivanna Wilcox     Multilevel degenerative disc disease     Overactive bladder     Sleep apnea     Unspecified constipation     Vitamin D deficiency        Allergies:    Fire ant, Shellfish allergy, and Iodine       Current Home Medications:    Current Outpatient Medications   Medication Instructions    amLODIPine (NORVASC) 2.5 MG tablet TAKE ONE TABLET BY MOUTH ONE TIME DAILY FOR HIGH BLOOD PRESSURE    ARIPiprazole (ABILIFY) 15 mg, Oral, Nightly    cefUROXime (CEFTIN) 250 mg, Oral, 2 TIMES DAILY    hydrOXYzine HCl (ATARAX) 10 MG tablet Take 1/2 - 1 tablet up to three times daily as needed for anxiety    meloxicam (MOBIC) 15 mg, Oral, DAILY    Multiple Vitamins-Minerals (MULTIVITAMIN ADULTS PO) Oral    traZODone (DESYREL) 25-50 mg, Oral, NIGHTLY       PDMP:  Last reviewed: 10/13/22    No results in previous 6 mo. Review of systems    Constitutional: denies significant weight loss/gain, fatigue    HEENT: denies rhinorrhea, sore throat. Respiratory: denies cough, shortness of breath    Cardiovascular: denies chest pain    GI: +constipation; denies N/V/D, abdominal pain. MSK: +intermittent back pain; denies joint pain, muscle stiffness/soreness, neck pain. Neuro: +brain fog; denies HA, dizziness. Psychiatric: as above and below.          Vital Signs:     Temp Readings from Last 3 Encounters:   10/24/22 97.9 °F (36.6 °C) (Oral)       BP Readings from Last 3 Encounters:   10/24/22 130/60   10/13/22 128/76   10/12/22 122/84       Pulse Readings from Last 3 Encounters:   10/24/22 67 10/13/22 65   10/12/22 68          Lab Results:     Lab Results   Component Value Date/Time    WBC 5.1 10/12/2022 09:41 AM    HGB 14.1 10/12/2022 09:41 AM    HCT 42.4 10/12/2022 09:41 AM    MCV 95.5 10/12/2022 09:41 AM    MCV 95.9 04/02/2020 10:27 AM    MCH 31.8 10/12/2022 09:41 AM    MCH 31.7 04/02/2020 10:27 AM    MCHC 33.3 10/12/2022 09:41 AM    MCHC 33.1 04/02/2020 10:27 AM    RDW 12.7 10/12/2022 09:41 AM    MPV 9.9 10/12/2022 09:41 AM    MONOPCT 7 10/12/2022 09:41 AM    MONOPCT 7 05/03/2022 09:51 AM    BASOPCT 0 10/12/2022 09:41 AM    BASOPCT 1 05/03/2022 09:51 AM    DIFFTYPE AUTOMATED 10/12/2022 09:41 AM         Lab Results   Component Value Date    TRIG 142 05/03/2022    HDL 48 05/03/2022    CHOL 165 05/03/2022    GLUCOSE 115 (H) 10/12/2022        All pertinent/available labs reviewed. CT Head performed on 10/4/22 that was unremarkable. Mental Status Examination    General/Appearance: Appears stated age, Well-kept, and Appropriately attired    Behavior: cooperative, engaged    Eye Contact: good    Psychomotor: Within normal limits    Musculoskeletal: gait wnl    Speech/Language: Within normal limits    Mood: \"it's still rough\"    Affect: Appropriate, Congruent, and Full-range; intermittently tearful    Thought process: linear, goal directed, and coherent    Thought content: denies current SI/HI, A/VH, paranoia or delusions; continues to experience AH    Orientation: oriented to person, place, and time    Memory: Intact long-term and Intact short-term    Attention/Concentration: Sustained    Fund of knowledge: fair    Judgement: fair    Insight: fair         Questionnaire Findings:    PHQ2: 0 (10/13/22)    GAD7: 21 (10/13/22)         Assessment/Summary of Findings:    Erick Paulino is a 58 y.o. female with reported prior psychiatric history significant for recent-onset AH, paranoia who presents for medication management.  They are currently prescribed: Abilify 10 mg QHS, Trazodone 50 mg QHS.    PT reports that she continues to experience AH of male voice threatening harm to her, but believes that it is \"quieter, it seems further away\" since increasing Abilify to 10 mg QHS last week. Continues to report compliance with medications and denies SE. Denies SI/HI, VH, or delusions. However, due to stress from ongoing symptoms, she has begun to experience intermittent panic attacks that are overwhelming. Due to reported potential partial response, will continue titration of Abilify at this time and start a trial of Hydroxyzine PRN for breakthrough anxiety. Regarding therapy, she reports that she is hoping to have an appt in early Nov with local contact. Will f/u in 2 wks to assess response and tolerability. Pt expressed agreement and understanding with plan. Diagnosis:   Schizophreniform disorder       Plan:    Katina Villarreal will receive medication management at [address] [place] [address]. Medication Recommendations:    - Increase Abilify to 15 mg QHS for AH, paranoia    - Continue Trazodone 50 mg QHS PRN    - Start Hydroxyzine 10-20 mg TID PRN for breakthrough anxiety    - Medication side effect profiles, risks, and benefits were discussed with the patient. - Patient encouraged to contact the clinic if experiencing any adverse reactions with medications. Other Recommendations:    - Recommended pt establishing with individual therapist. She reports that she has a local contact and is hoping to establish care in early Nov    - If patient has any concerns for their own safety due to adverse reactions to medications, suicidal or homicidal ideations, auditory or visual hallucinations, or delusions, they have been told to call 911 or go to the nearest emergency department.       RTC: 2 wks    35 minutes were spent on the day of the encounter for preparation/chart review, evaluation, psychoeducation, medication management, supportive counseling, documentation, and all associated

## 2022-10-29 ENCOUNTER — TELEPHONE (OUTPATIENT)
Dept: INTERNAL MEDICINE CLINIC | Facility: CLINIC | Age: 62
End: 2022-10-29

## 2022-10-29 DIAGNOSIS — G47.33 OSA ON CPAP: ICD-10-CM

## 2022-10-29 DIAGNOSIS — F41.9 ANXIETY: ICD-10-CM

## 2022-10-29 DIAGNOSIS — Z99.89 OSA ON CPAP: ICD-10-CM

## 2022-10-29 DIAGNOSIS — G47.9 SLEEP DISTURBANCE: Primary | ICD-10-CM

## 2022-10-29 RX ORDER — TRAZODONE HYDROCHLORIDE 100 MG/1
100 TABLET ORAL NIGHTLY PRN
Qty: 30 TABLET | Refills: 5 | Status: SHIPPED | OUTPATIENT
Start: 2022-10-29

## 2022-10-30 NOTE — TELEPHONE ENCOUNTER
Requested Prescriptions     Signed Prescriptions Disp Refills    traZODone (DESYREL) 100 MG tablet 30 tablet 5     Sig: Take 1 tablet by mouth nightly as needed for Sleep     Authorizing Provider: Abel Beltrán

## 2022-11-02 ENCOUNTER — OFFICE VISIT (OUTPATIENT)
Dept: UROGYNECOLOGY | Age: 62
End: 2022-11-02
Payer: COMMERCIAL

## 2022-11-02 VITALS — BODY MASS INDEX: 38.09 KG/M2 | WEIGHT: 207 LBS | HEIGHT: 62 IN

## 2022-11-02 DIAGNOSIS — N81.89 WEAKNESS OF PELVIC FLOOR: ICD-10-CM

## 2022-11-02 DIAGNOSIS — N39.41 URGE INCONTINENCE: Primary | ICD-10-CM

## 2022-11-02 DIAGNOSIS — N39.3 SUI (STRESS URINARY INCONTINENCE, FEMALE): ICD-10-CM

## 2022-11-02 DIAGNOSIS — G47.33 OSA (OBSTRUCTIVE SLEEP APNEA): ICD-10-CM

## 2022-11-02 LAB
BILIRUBIN, URINE, POC: NEGATIVE
BLOOD URINE, POC: NEGATIVE
GLUCOSE URINE, POC: NEGATIVE
KETONES, URINE, POC: NEGATIVE
LEUKOCYTE ESTERASE, URINE, POC: NEGATIVE
NITRITE, URINE, POC: NEGATIVE
PH, URINE, POC: 6 (ref 4.6–8)
PROTEIN,URINE, POC: NEGATIVE
SPECIFIC GRAVITY, URINE, POC: 1.02 (ref 1–1.03)
URINALYSIS CLARITY, POC: CLEAR
URINALYSIS COLOR, POC: YELLOW
UROBILINOGEN, POC: 1

## 2022-11-02 PROCEDURE — 81003 URINALYSIS AUTO W/O SCOPE: CPT | Performed by: OBSTETRICS & GYNECOLOGY

## 2022-11-02 PROCEDURE — 99204 OFFICE O/P NEW MOD 45 MIN: CPT | Performed by: OBSTETRICS & GYNECOLOGY

## 2022-11-02 PROCEDURE — 51701 INSERT BLADDER CATHETER: CPT | Performed by: OBSTETRICS & GYNECOLOGY

## 2022-11-02 NOTE — PROGRESS NOTES
[facility] UROGYNECOLOGY  [address]  Dept: [phone]        PCP:  Antonia Beckwith MD    11/2/2022        HPI:  I am being asked to see this patient in consultation by Dr. Jennifer Kwan   for No chief complaint on file. .     Ms. Justine Meyers  has been leaking urine for 1 year. She leaks urine daytime/nighttime. She does leak urine with cough, laugh, sneeze and activity. She does leak urine with urgency. She  uses thin and goes through 6 in 24 hours. She leaks 3 times per day. When she leaks she leaks small amounts. She has not tried PT, she has tried medication Oxybutynin. She has not had procedures/surgery for this in the past.     UUI> VIVIAN    She voids 10 times during the day. She voids 1-2 times over night. She has 1 BM per week, and does not strain. She drinks   She drinks 1 caffeine drinks beverages per day. Diet coke  She uses 1 artificial sweeteners per day. She drinks 7 alcoholic beverages per week. She has pelvic surgery in the past. Abdominal Hysterectomy   Her last PAP: 10 years ago    Her last Colonoscopy: 2022  Her last Mammogram: 2022    She does not have a history of DM. Lab Results   Component Value Date    LABA1C 5.5 10/12/2022     Lab Results   Component Value Date     10/12/2022       She does have a history of sleep apnea. Tobacco: No    Sexual History: not sexually active. Notes were reviewed from the referring provider Dr Jennifer Kwan.     Results for orders placed or performed in visit on 11/02/22   AMB POC URINALYSIS DIP STICK AUTO W/O MICRO   Result Value Ref Range    Color, Urine, POC yellow     Clarity, Urine, POC clear     Glucose, Urine, POC Negative Negative    Bilirubin, Urine, POC Negative Negative    Ketones, Urine, POC Negative Negative    Specific Gravity, Urine, POC 1.020 1.001 - 1.035    Blood, Urine, POC negative Negative    pH, Urine, POC 6.0 4.6 - 8.0    Protein, Urine, POC Negative Negative Urobilinogen, POC 1.0     Nitrate, Urine, POC negative Negative    Leukocyte Esterase, Urine, POC Negative Negative       Ht 5' 2\" (1.575 m)   Wt 207 lb (93.9 kg)   BMI 37.86 kg/m²     PVR by straight catheterization: 40 cc    Physical Exam  Vitals reviewed. Exam conducted with a chaperone present. Constitutional:       General: She is not in acute distress. Appearance: Normal appearance. HENT:      Head: Normocephalic and atraumatic. Pulmonary:      Effort: Pulmonary effort is normal. No respiratory distress. Abdominal:      General: There is no distension. Palpations: There is no mass. Tenderness: There is no abdominal tenderness. There is no guarding or rebound. Hernia: No hernia is present. Musculoskeletal:      Cervical back: Normal range of motion. Skin:     General: Skin is warm and dry. Neurological:      Mental Status: She is alert and oriented to person, place, and time. Psychiatric:         Mood and Affect: Mood normal.         Behavior: Behavior normal.         Thought Content: Thought content normal.         Judgment: Judgment normal.        Female Genitourinary   Vulva:    Normal. No lesions  Bartholin's Gland:  Bilateral , Normal, nontender  Skenes Gland:  Bilateral, Normal, nontender   Clitoris:  Normal.   Introitus:    Normal.   Urethral Meatus:  Normal appearing, normal size, no lesions, no prolapse  Urethra:  No masses, no tenderness  Vagina:  No atrophy, no discharge, no lesions  Cervix:  Absent  Uterus:  Absent   Adnexa:   No masses palpated, no tenderness  Bladder:  No tenderness, no masses palpated  Perineum:  Normal, no lesions    Rectal   Anorectal Exam: No hemorrhoids and no masses or lesions of the perineum        Pelvic floor muscles: Tender Spasm     R. Puborectalis: NO 0 /5    L. Puborectalis: NO 0 /5    R. Pubococcyg NO 0 /5    L. Pubococcyg NO 0 /5    R. Ileococcyg: NO 0 /5    L. Ileococcyg: NO 0 /5    R. Obturator Int: NO 0 /5    L.  Obturator Int: NO 0 /5    R. Coccygeus: NO 0 /5    L. Coccygeus: NO 0 /5      Pelvic floor contractions: 0/5    Supine Stress Test of VIVIAN: Negative    Neurological Exam:   Sensorineural Exam:    Bulvocavernosus reflex:  Normal   Anal [place]:  Normal      1. Urge incontinence  Assessment & Plan:  We discussed the differential diagnosis of overactive bladder. We discussed the epidemiology, pathogenesis, and etiology of bladder overactivity including the neurophysiologic axis. We also discussed the treatment pathway for OAB. I offered options which include nothing, medications, physical therapy, behavior modification, and neuromodulation. Cut back on diet coke and alcohol    S/p Oxybutynin. The patient has overactive bladder. She has tried conservative management including dietary modifications, bladder training and physical therapy. She is requiring medical therapy. Long-term continuing therapy with anticholinergic drugs could induce brain changes partially comparable to those present in Alzheimers disease. We discussed the risks of dry eyes, dry mouth and constipation as well as slow gastric motility. She is not a candidate for anticholinergics because has concerns for the risk of memory loss/ dementia. She already has dry eyes, dry mouth, acid reflux and constipation. Since there are other safer options for treatment, we will prescribe a beta-3 agonist.     She has a history of high blood pressure and therefore is not a candidate for Mirbegron. After assess her symptoms, medical history and other medications, she will be treated with Ann Nicole. Samples and coupon given. Orders:  -     AMB POC URINALYSIS DIP STICK AUTO W/O MICRO  -     INSERT,NON-INDWELLING BLADDER CATHETER  2. VIVIAN (stress urinary incontinence, female)  Assessment & Plan:  UUI> VIVIAN so we will treat UUI first   3. Weakness of pelvic floor  4.  JOVANI (obstructive sleep apnea)  Assessment & Plan:  We discuss how sleep apnea may be related to the underlying voiding problem. Uncontrolled sleep apnea may cause nocturnal polyuria (increase in urine production at night) by release of atrial natriuretic peptide (ANP) due to negative intrathoracic pressure and stretching of the myocardium This results in increased sodium and water excretion. CPAP can result in significant reductions in ANP levels and in nocturia episodes. Cont wearing CPAP. We discussed the purpose of physical therapy which is to strengthen the pelvic floor muscles and teach proper coordination of those muscles. I described the anatomy of those muscles involved and their relationship to the end-organs in the pelvis. I described therapy techniques which include a combination of therapeutic exercise, biofeedback, neuromuscular re-education, home programs, and electrical stimulation, as well as therapeutic massage and ultrasound for pain. Return in about 1 month (around 12/2/2022) for Med Check- vibegron check.             Berna Coffey, DO

## 2022-11-02 NOTE — ASSESSMENT & PLAN NOTE
We discussed the differential diagnosis of overactive bladder. We discussed the epidemiology, pathogenesis, and etiology of bladder overactivity including the neurophysiologic axis. We also discussed the treatment pathway for OAB. I offered options which include nothing, medications, physical therapy, behavior modification, and neuromodulation. Cut back on diet coke and alcohol    S/p Oxybutynin. The patient has overactive bladder. She has tried conservative management including dietary modifications, bladder training and physical therapy. She is requiring medical therapy. Long-term continuing therapy with anticholinergic drugs could induce brain changes partially comparable to those present in Alzheimers disease. We discussed the risks of dry eyes, dry mouth and constipation as well as slow gastric motility. She is not a candidate for anticholinergics because has concerns for the risk of memory loss/ dementia. She already has dry eyes, dry mouth, acid reflux and constipation. Since there are other safer options for treatment, we will prescribe a beta-3 agonist.     She has a history of high blood pressure and therefore is not a candidate for Mirbegron. After assess her symptoms, medical history and other medications, she will be treated with Bethel Gitelman. Samples and coupon given.

## 2022-11-07 ENCOUNTER — TELEPHONE (OUTPATIENT)
Dept: INTERNAL MEDICINE CLINIC | Facility: CLINIC | Age: 62
End: 2022-11-07

## 2022-11-17 ENCOUNTER — OFFICE VISIT (OUTPATIENT)
Dept: BEHAVIORAL/MENTAL HEALTH CLINIC | Facility: CLINIC | Age: 62
End: 2022-11-17

## 2022-11-17 DIAGNOSIS — F20.81 SCHIZOPHRENIFORM DISORDER (HCC): Primary | ICD-10-CM

## 2022-11-17 DIAGNOSIS — F41.9 ANXIETY DISORDER, UNSPECIFIED TYPE: ICD-10-CM

## 2022-11-17 ASSESSMENT — PATIENT HEALTH QUESTIONNAIRE - PHQ9
SUM OF ALL RESPONSES TO PHQ QUESTIONS 1-9: 2
2. FEELING DOWN, DEPRESSED OR HOPELESS: 0
1. LITTLE INTEREST OR PLEASURE IN DOING THINGS: 2
SUM OF ALL RESPONSES TO PHQ QUESTIONS 1-9: 2
SUM OF ALL RESPONSES TO PHQ QUESTIONS 1-9: 2
SUM OF ALL RESPONSES TO PHQ9 QUESTIONS 1 & 2: 2
SUM OF ALL RESPONSES TO PHQ QUESTIONS 1-9: 2
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
## 2022-11-18 ENCOUNTER — TELEPHONE (OUTPATIENT)
Dept: INTERNAL MEDICINE CLINIC | Facility: CLINIC | Age: 62
End: 2022-11-18

## 2022-11-18 NOTE — TELEPHONE ENCOUNTER
Care Transitions Initial Follow Up Call    Call within 2 business days of discharge: Yes     Patient: Rayo Montana Patient : 1960 MRN: [id number]    Discharge date: 2022    RARS: No data recorded     Spoke with: Carolyn Jean    Discharge department/facility: [facility]    Non-face-to-face services provided:  Scheduled appointment with PCP-2022  Scheduled appointment with Specialist-2022 (therapist)  Obtained and reviewed discharge summary and/or continuity of care documents    Follow Up  Future Appointments   Date Time Provider Jean Paul Loyd   2022 10:00 AM ADONAY Brown CNP [facility] AMB   2022  8:15 AM Dion Queen DO [facility] AMB   2022  2:00 PM KAIDEN Vazquez B[facility] AMB       Sidney Recinos NP, APRN - CNP

## 2022-11-21 ENCOUNTER — OFFICE VISIT (OUTPATIENT)
Dept: INTERNAL MEDICINE CLINIC | Facility: CLINIC | Age: 62
End: 2022-11-21

## 2022-11-21 VITALS
WEIGHT: 208 LBS | HEART RATE: 73 BPM | HEIGHT: 62 IN | TEMPERATURE: 97.2 F | DIASTOLIC BLOOD PRESSURE: 74 MMHG | SYSTOLIC BLOOD PRESSURE: 122 MMHG | OXYGEN SATURATION: 97 % | BODY MASS INDEX: 38.28 KG/M2

## 2022-11-21 DIAGNOSIS — F41.9 ANXIETY: ICD-10-CM

## 2022-11-21 DIAGNOSIS — G47.9 SLEEP DISTURBANCE: ICD-10-CM

## 2022-11-21 DIAGNOSIS — G47.33 OSA ON CPAP: ICD-10-CM

## 2022-11-21 DIAGNOSIS — Z99.89 OSA ON CPAP: ICD-10-CM

## 2022-11-21 DIAGNOSIS — R44.0 AUDITORY HALLUCINATIONS: ICD-10-CM

## 2022-11-21 DIAGNOSIS — Z09 HOSPITAL DISCHARGE FOLLOW-UP: Primary | ICD-10-CM

## 2022-11-21 RX ORDER — RISPERIDONE 2 MG/1
TABLET ORAL
COMMUNITY
Start: 2022-11-14

## 2022-11-21 RX ORDER — DOCUSATE SODIUM 100 MG/1
100 CAPSULE, LIQUID FILLED ORAL DAILY PRN
COMMUNITY

## 2022-11-21 ASSESSMENT — PATIENT HEALTH QUESTIONNAIRE - PHQ9
SUM OF ALL RESPONSES TO PHQ QUESTIONS 1-9: 0
1. LITTLE INTEREST OR PLEASURE IN DOING THINGS: 0
SUM OF ALL RESPONSES TO PHQ QUESTIONS 1-9: 0
SUM OF ALL RESPONSES TO PHQ QUESTIONS 1-9: 0
2. FEELING DOWN, DEPRESSED OR HOPELESS: 0
SUM OF ALL RESPONSES TO PHQ9 QUESTIONS 1 & 2: 0
SUM OF ALL RESPONSES TO PHQ QUESTIONS 1-9: 0

## 2022-11-21 ASSESSMENT — ENCOUNTER SYMPTOMS: RESPIRATORY NEGATIVE: 1

## 2022-11-21 NOTE — PROGRESS NOTES
Post-Discharge Transitional Care Follow Up    Radha Dumont   YOB: 1960    Date of Office Visit:  2022  Date of Hospital Admission: 2022  Date of Hospital Discharge: 2022    Care management risk score Rising risk (score 2-5) and Complex Care (Scores >=6): No Risk Score On File     Non face to face  following discharge, date last encounter closed (first attempt may have been earlier): 2022     Call initiated 2 business days of discharge: Yes    ASSESSMENT/PLAN:   Hospital discharge follow-up  -     [place] DISCHARGE MEDS RECONCILED W/ CURRENT OUTPATIENT MED LIST  Auditory hallucinations  Sleep disturbance  JOVANI on CPAP  Anxiety  Scheduled with psychiatry tomorrow for follow up. Follow up with therapist on 2022; she is seeing Mena Barclay. For now, continue current medications. Great social support from spouse. She has not returned to work this week. Medical Decision Making: straightforward  Return in about 6 months (around 2023). Subjective:   Radha Dumont (: 1960) for follow up from [facility]. She was discharged on Risperdal 2mg but accidentally increased to BID for 3 days. She is taking Trazodone 50mg Qhs. She is sleeping well on CPAP nightly. No voices during the day; she is hearing \"her neighbor\" at night saying that he wants to die. She state she is also playing loud music. Her  accompanies her today; does not corroborate. When asked how she redirects her thoughts, she states she was told to remind herself that \"it's not real\". Inpatient course: Discharge summary reviewed- see chart. Interval history/Current status: stable; still with nocturnal auditory hallucinations.     Patient Active Problem List   Diagnosis    Mixed conductive and sensorineural hearing loss of left ear with unrestricted hearing of right ear    Urge incontinence    Inadequate sleep hygiene    Essential hypertension    Morbid obesity with BMI of 40.0-44.9, adult ([facility] [address]    Chronic bilateral low back pain without sciatica    Bladder pain    Severe obesity with body mass index (BMI) of 35.0 to 39.9 with serious comorbidity (HCC)    Obesity (BMI 30-39.9)    JOVANI (obstructive sleep apnea)    Gastroesophageal reflux disease without esophagitis    Sleep apnea with use of continuous positive airway pressure (CPAP)    Auditory hallucinations    Chronic sinusitis    Vitamin D deficiency    VIVIAN (stress urinary incontinence, female)    Weakness of pelvic floor       Medication list at time of discharge reviewed: Yes    Medications marked \"taking\" at this time  Outpatient Medications Marked as Taking for the 11/21/22 encounter (Office Visit) with ADONAY Westfall CNP   Medication Sig Dispense Refill    risperiDONE (RISPERDAL) 2 MG tablet TAKE ONE TABLET BY MOUTH ONE TIME DAILY AT 2100 FOR PSYCHOSIS      docusate sodium (COLACE) 100 MG capsule Take 100 mg by mouth daily as needed for Constipation      [START ON 12/2/2022] Vibegron 75 MG TABS Take 75 mg by mouth daily 30 tablet 11    traZODone (DESYREL) 100 MG tablet Take 1 tablet by mouth nightly as needed for Sleep (Patient taking differently: Take 50 mg by mouth nightly as needed for Sleep) 30 tablet 5    hydrOXYzine HCl (ATARAX) 10 MG tablet Take 1/2 - 1 tablet up to three times daily as needed for anxiety 90 tablet 2    Multiple Vitamins-Minerals (MULTIVITAMIN ADULTS PO) Take by mouth      amLODIPine (NORVASC) 2.5 MG tablet TAKE ONE TABLET BY MOUTH ONE TIME DAILY FOR HIGH BLOOD PRESSURE      meloxicam (MOBIC) 15 MG tablet Take 15 mg by mouth daily as needed          Medications patient taking as of now reconciled against medications ordered at time of hospital discharge: Yes    Review of Systems   Constitutional:  Positive for fatigue. Respiratory: Negative. Cardiovascular: Negative. Psychiatric/Behavioral:  Positive for decreased concentration, hallucinations and sleep disturbance.  Negative for agitation, behavioral problems, confusion, dysphoric mood, self-injury and suicidal ideas. The patient is not nervous/anxious and is not hyperactive. Objective:    /74 (Site: Right Upper Arm, Position: Sitting)   Pulse 73   Temp 97.2 °F (36.2 °C) (Temporal)   Ht 5' 2\" (1.575 m)   Wt 208 lb (94.3 kg)   SpO2 97%   BMI 38.04 kg/m²   Physical Exam  Vitals reviewed. Constitutional:       General: She is not in acute distress. Appearance: Normal appearance. Cardiovascular:      Pulses: Normal pulses. Heart sounds: Normal heart sounds. No murmur heard. No friction rub. No gallop. Pulmonary:      Effort: Pulmonary effort is normal.      Breath sounds: Normal breath sounds. Neurological:      Mental Status: She is alert. Psychiatric:         Mood and Affect: Mood normal.         An electronic signature was used to authenticate this note.   --Milly Caldwell, NP, APRN - CNP

## 2022-11-22 ENCOUNTER — OFFICE VISIT (OUTPATIENT)
Dept: BEHAVIORAL/MENTAL HEALTH CLINIC | Age: 62
End: 2022-11-22
Payer: COMMERCIAL

## 2022-11-22 VITALS
BODY MASS INDEX: 38.09 KG/M2 | WEIGHT: 207 LBS | HEART RATE: 73 BPM | OXYGEN SATURATION: 95 % | SYSTOLIC BLOOD PRESSURE: 118 MMHG | TEMPERATURE: 98.2 F | HEIGHT: 62 IN | DIASTOLIC BLOOD PRESSURE: 78 MMHG

## 2022-11-22 DIAGNOSIS — F20.81 SCHIZOPHRENIFORM DISORDER (HCC): Primary | ICD-10-CM

## 2022-11-22 PROCEDURE — 3078F DIAST BP <80 MM HG: CPT | Performed by: STUDENT IN AN ORGANIZED HEALTH CARE EDUCATION/TRAINING PROGRAM

## 2022-11-22 PROCEDURE — 3074F SYST BP LT 130 MM HG: CPT | Performed by: STUDENT IN AN ORGANIZED HEALTH CARE EDUCATION/TRAINING PROGRAM

## 2022-11-22 PROCEDURE — 99213 OFFICE O/P EST LOW 20 MIN: CPT | Performed by: STUDENT IN AN ORGANIZED HEALTH CARE EDUCATION/TRAINING PROGRAM

## 2022-11-22 ASSESSMENT — PATIENT HEALTH QUESTIONNAIRE - PHQ9
2. FEELING DOWN, DEPRESSED OR HOPELESS: 0
SUM OF ALL RESPONSES TO PHQ9 QUESTIONS 1 & 2: 0
SUM OF ALL RESPONSES TO PHQ QUESTIONS 1-9: 0
SUM OF ALL RESPONSES TO PHQ QUESTIONS 1-9: 0
1. LITTLE INTEREST OR PLEASURE IN DOING THINGS: 0
SUM OF ALL RESPONSES TO PHQ QUESTIONS 1-9: 0
SUM OF ALL RESPONSES TO PHQ QUESTIONS 1-9: 0

## 2022-11-22 NOTE — PROGRESS NOTES
Susie       Patient Name: Uzma Charles    Patient : 1960    Patient MRN: [id number]    Insurance: [facility]    Primary Language: English      Date of Service: 2022    Type of Service: Medication management    Other Services Involved: N/A       Phone Number: [phone]   Emergency Contact: Dread Schwab, , [phone]       Chief Complaint: Jose putnam has happened         History of Present Illness    Uzma Charles is a 58 y.o. female with reported prior psychiatric history significant for recent-onset AH, paranoia who presents for medication management. They are currently prescribed: Abilify 15 mg QHS, Trazodone 50 mg QHS. Pt reports that her AH continued to worsen, expressing that she was having worse thoughts of the man \"going to kill me today\" and started sleeping at her daughters' house. She notified her sister, who helped her get hospitalized voluntarily at [facility] for 8 days (discharged 22). Her medications were adjusted to Risperdal and she is currently prescribed 2 mg QHS. Reports that the voices are \"a lot better\" and denies AH over the past couple of days. Initially experienced mild SE, including drooling, dry mouth, fatigue during the day. Denies akathisia. Denies active SI/HI, A/VH, paranoia or delusions. She also recently engaged with [facility] for therapy. Last Visit (10/24/22): Since last appt, pt reached out to report worsening symptoms and had no experienced any significant difference with initiation of Abilify. Due to reported tolerability, increased Abilify to 10 mg QHS last week. Pt reports that he AH have become \"quieter\" at night, but continues to struggle with sleep if she wakes up. Also reports that she has begun to experience anxiety attacks \"that are new for me,\" describing the sensation as \"everything feels like it's falling down on me, I can't concentrate, I'm going off on my . \" Reports that she will often wake up with more anxiety, stating that she will hear his voice \"yelling that he will kill me, where's my gun!?\"  She is looking forward to retiring in Dec, as this will likely reduce her stress. States that she was able to reach out to local contact for counseling and is hoping to start within the next few weeks. Denies SI/HI, paranoia or delusions. Psychiatric Review of Systems    Depression: Denies depressed mood and anhedonia. Denies prior hx of depression. Denies current or prior SI/HI. Anxiety: excessive anxiety and worry, feelings of restlessness, difficulty concentrating, irritability, and sleep disturbance    Hypomania/carey: Denies inflated self-esteem or grandiosity, decreased need for sleep, more talkative or pressured speech, flight of ideas or racing thoughts, distractibility, and increased goal-directed activity    Psychosis: Reports recent onset of AH, paranoia of being followed since Aug '22, stating that she has started closing the blinds, \"being afraid of even leaving my driveway. \" Expresses that the sensations as \"weird\" and is distressed by these symptoms. Trauma: Denies  re-experiencing, avoidance behaviors, hypervigilance or reactivity, negative self-concept, and affect dysregulation         Psychiatric History    Please see prior records. New medication trials: Risperdal  Recently hospitalized at [facility] voluntarily for 8 days for worsening AH; discharged 11/14/22       Family History    Please see old records. No updates at this time. Social History      Please see old records. No updates at this time. Substance Abuse History      Please see old records. No updates at this time.          Past Medical History:    Past Medical History:   Diagnosis Date    Elbow fracture, left     Eye exam, routine 01/30/2020    Dr. Kiersten Hayes     Multilevel degenerative disc disease     Overactive bladder     Sleep apnea Unspecified constipation     Vitamin D deficiency        Allergies:    Fire ant, Shellfish allergy, and Iodine       Current Home Medications:    Current Outpatient Medications   Medication Instructions    amLODIPine (NORVASC) 2.5 MG tablet TAKE ONE TABLET BY MOUTH ONE TIME DAILY FOR HIGH BLOOD PRESSURE    docusate sodium (COLACE) 100 mg, Oral, DAILY PRN    hydrOXYzine HCl (ATARAX) 10 MG tablet Take 1/2 - 1 tablet up to three times daily as needed for anxiety    meloxicam (MOBIC) 15 mg, Oral, DAILY PRN    Multiple Vitamins-Minerals (MULTIVITAMIN ADULTS PO) Oral    risperiDONE (RISPERDAL) 2 MG tablet TAKE ONE TABLET BY MOUTH ONE TIME DAILY AT 2100 FOR PSYCHOSIS    traZODone (DESYREL) 100 mg, Oral, NIGHTLY PRN    [START ON 12/2/2022] Vibegron 75 mg, Oral, DAILY       PDMP:  Last reviewed: 10/13/22    No results in previous 6 mo. Review of systems    Constitutional: denies significant weight loss/gain, fatigue    HEENT: denies rhinorrhea, sore throat. Respiratory: denies cough, shortness of breath    Cardiovascular: denies chest pain    GI: +constipation; denies N/V/D, abdominal pain. MSK: +intermittent back pain; denies joint pain, muscle stiffness/soreness, neck pain. Neuro: +brain fog; denies HA, dizziness. Psychiatric: as above and below.          Vital Signs:     Temp Readings from Last 3 Encounters:   11/21/22 97.2 °F (36.2 °C) (Temporal)   10/24/22 97.9 °F (36.6 °C) (Oral)       BP Readings from Last 3 Encounters:   11/21/22 122/74   10/24/22 130/60   10/13/22 128/76       Pulse Readings from Last 3 Encounters:   11/21/22 73   10/24/22 67   10/13/22 65          Lab Results:     Lab Results   Component Value Date/Time    WBC 5.1 10/12/2022 09:41 AM    HGB 14.1 10/12/2022 09:41 AM    HCT 42.4 10/12/2022 09:41 AM    MCV 95.5 10/12/2022 09:41 AM    MCV 95.9 04/02/2020 10:27 AM    MCH 31.8 10/12/2022 09:41 AM    MCH 31.7 04/02/2020 10:27 AM    MCHC 33.3 10/12/2022 09:41 AM    [facility] 33.1 04/02/2020 10:27 AM    RDW 12.7 10/12/2022 09:41 AM    MPV 9.9 10/12/2022 09:41 AM    MONOPCT 7 10/12/2022 09:41 AM    MONOPCT 7 05/03/2022 09:51 AM    BASOPCT 0 10/12/2022 09:41 AM    BASOPCT 1 05/03/2022 09:51 AM    DIFFTYPE AUTOMATED 10/12/2022 09:41 AM         Lab Results   Component Value Date    TRIG 142 05/03/2022    HDL 48 05/03/2022    CHOL 165 05/03/2022    GLUCOSE 115 (H) 10/12/2022        All pertinent/available labs reviewed. CT Head performed on 10/4/22 that was unremarkable. Mental Status Examination    General/Appearance: Appears stated age, Well-kept, and Appropriately attired    Behavior: cooperative, engaged    Eye Contact: good    Psychomotor: Within normal limits    Musculoskeletal: gait wnl    Speech/Language: Within normal limits    Mood: \"much better\"    Affect: Appropriate, Congruent, and Full-range    Thought process: linear, goal directed, and coherent    Thought content: denies current SI/HI, A/VH, paranoia or delusions    Orientation: oriented to person, place, and time    Memory: Intact long-term and Intact short-term    Attention/Concentration: Sustained    Fund of knowledge: fair    Judgement: fair    Insight: fair         Questionnaire Findings:    PHQ2: 0 (11/22/22)    GAD7: 17 (10/24/22)         Assessment/Summary of Findings:    William Hutchison is a 58 y.o. female with reported prior psychiatric history significant for recent-onset AH, paranoia who presents for medication management. They are currently prescribed: Abilify 10 mg QHS, Trazodone 50 mg QHS. Pt was recently hospitalized at the [facility] for 8 days for worsening AH and her medication was subsequently adjusted to Risperdal. Reports that she is currently taking 2 mg QHS and has experienced significant reduction in reported AH, expressing that she has not had AH over the past several years. She initially experienced mild SE, such as drooling, but overall denies significant concerns.  She appears much brighter than prior appt and deneis SI/HI, A/VH, paranoia or delusions. Discussed maintaining regimen at this time and will f/u in 2 wks. Diagnosis:   Schizophreniform disorder       Plan:    Joe Marcus will receive medication management at [facility],[address]. Medication Recommendations:    - Continue Rispderal 2 mg QHS    - Continue Trazodone 50 mg QHS PRN    - Continue Hydroxyzine 10-20 mg TID PRN for breakthrough anxiety    - Medication side effect profiles, risks, and benefits were discussed with the patient. - Patient encouraged to contact the clinic if experiencing any adverse reactions with medications. Other Recommendations:    - Recommended pt establishing with individual therapist. She reports that she has a local contact and is hoping to establish care in early Nov    - If patient has any concerns for their own safety due to adverse reactions to medications, suicidal or homicidal ideations, auditory or visual hallucinations, or delusions, they have been told to call 911 or go to the nearest emergency department.       RTC: 2 wks      Hayden Seth MD    11/28/2022 2:48 PM    [address] [facility],[address]

## 2022-11-29 ENCOUNTER — TELEPHONE (OUTPATIENT)
Dept: FAMILY MEDICINE CLINIC | Facility: CLINIC | Age: 62
End: 2022-11-29

## 2022-12-05 ENCOUNTER — OFFICE VISIT (OUTPATIENT)
Dept: UROGYNECOLOGY | Age: 62
End: 2022-12-05
Payer: COMMERCIAL

## 2022-12-05 DIAGNOSIS — G47.33 OSA (OBSTRUCTIVE SLEEP APNEA): ICD-10-CM

## 2022-12-05 DIAGNOSIS — N32.81 OAB (OVERACTIVE BLADDER): Primary | ICD-10-CM

## 2022-12-05 PROCEDURE — 99215 OFFICE O/P EST HI 40 MIN: CPT | Performed by: OBSTETRICS & GYNECOLOGY

## 2022-12-05 NOTE — PROGRESS NOTES
[facility] UROGYNECOLOGY  [address]  Dept: [phone]    PCP:  Marychuy Whitaker MD    12/5/2022      HPI:  Antonio Barrios is here to follow up on Follow-up (Medication worked while she had it.)  . She was taking Vibegron 75mg. She was voiding 4 times per day. She was not having to get up at night. She was not leaking at all. This is all an improvement as she was voiding 10 times during the day and 1-2 times overnight and leaking 3 times per day. She is s/p oxybutynin. No results found for this visit on 12/05/22. There were no vitals taken for this visit. 1. OAB (overactive bladder)  Assessment & Plan:   She is s/p Vibegron and oxybutynin. We discussed the differential diagnosis of overactive bladder. We discussed the epidemiology, pathogenesis, and etiology of bladder overactivity including the neurophysiologic axis. I offered options which include nothing, medications, physical therapy, behavior modification, and neuromodulation. She has tried medications and physical therapy without any improvement. After discussing her options for third line therapy, she is interested in botox/ chemodenervation to the pelvic floor. We discussed the benefits of botox. Many women have at least 50% reduction inn daily leaking episodes and that it may have to be injected every 4-12 months. We discussed the risks of botox which include UTI, urinary retention and a reaction to the onabotulinumtoxinA (Botox). She also   States that she is willing to self-cath if she does develop urinary retention. We also discussed SNM. She is going to try the coupon for vibegron and read about botox and SNM. If she is interested in wither the botox or SNM, she will need to be set up for cystoscopy and UDS. 2. JOVANI (obstructive sleep apnea)   Return if symptoms worsen or fail to improve.                       Timoteo Delaney,

## 2022-12-07 ENCOUNTER — OFFICE VISIT (OUTPATIENT)
Dept: BEHAVIORAL/MENTAL HEALTH CLINIC | Age: 62
End: 2022-12-07
Payer: COMMERCIAL

## 2022-12-07 VITALS
BODY MASS INDEX: 38.83 KG/M2 | HEART RATE: 67 BPM | WEIGHT: 211 LBS | HEIGHT: 62 IN | OXYGEN SATURATION: 97 % | SYSTOLIC BLOOD PRESSURE: 120 MMHG | DIASTOLIC BLOOD PRESSURE: 70 MMHG

## 2022-12-07 DIAGNOSIS — F20.81 SCHIZOPHRENIFORM DISORDER (HCC): Primary | ICD-10-CM

## 2022-12-07 PROCEDURE — 3078F DIAST BP <80 MM HG: CPT | Performed by: STUDENT IN AN ORGANIZED HEALTH CARE EDUCATION/TRAINING PROGRAM

## 2022-12-07 PROCEDURE — 99213 OFFICE O/P EST LOW 20 MIN: CPT | Performed by: STUDENT IN AN ORGANIZED HEALTH CARE EDUCATION/TRAINING PROGRAM

## 2022-12-07 PROCEDURE — 3074F SYST BP LT 130 MM HG: CPT | Performed by: STUDENT IN AN ORGANIZED HEALTH CARE EDUCATION/TRAINING PROGRAM

## 2022-12-07 RX ORDER — RISPERIDONE 3 MG/1
3 TABLET ORAL
Qty: 30 TABLET | Refills: 2 | Status: SHIPPED | OUTPATIENT
Start: 2022-12-07

## 2022-12-07 ASSESSMENT — PATIENT HEALTH QUESTIONNAIRE - PHQ9
SUM OF ALL RESPONSES TO PHQ QUESTIONS 1-9: 0
1. LITTLE INTEREST OR PLEASURE IN DOING THINGS: 0
SUM OF ALL RESPONSES TO PHQ QUESTIONS 1-9: 0
2. FEELING DOWN, DEPRESSED OR HOPELESS: 0
SUM OF ALL RESPONSES TO PHQ QUESTIONS 1-9: 0
SUM OF ALL RESPONSES TO PHQ9 QUESTIONS 1 & 2: 0
SUM OF ALL RESPONSES TO PHQ QUESTIONS 1-9: 0

## 2022-12-07 NOTE — PATIENT INSTRUCTIONS
Obtain blood work through PCP, including CBC with differential, comprehensive metabolic panel, lipid panel

## 2022-12-08 ENCOUNTER — OFFICE VISIT (OUTPATIENT)
Dept: BEHAVIORAL/MENTAL HEALTH CLINIC | Facility: CLINIC | Age: 62
End: 2022-12-08

## 2022-12-08 DIAGNOSIS — F20.81 SCHIZOPHRENIFORM DISORDER (HCC): Primary | ICD-10-CM

## 2022-12-08 ASSESSMENT — PATIENT HEALTH QUESTIONNAIRE - PHQ9
SUM OF ALL RESPONSES TO PHQ QUESTIONS 1-9: 2
2. FEELING DOWN, DEPRESSED OR HOPELESS: 0
1. LITTLE INTEREST OR PLEASURE IN DOING THINGS: 2
SUM OF ALL RESPONSES TO PHQ9 QUESTIONS 1 & 2: 2
SUM OF ALL RESPONSES TO PHQ QUESTIONS 1-9: 2

## 2022-12-08 ASSESSMENT — ANXIETY QUESTIONNAIRES
1. FEELING NERVOUS, ANXIOUS, OR ON EDGE: 0
GAD7 TOTAL SCORE: 1
3. WORRYING TOO MUCH ABOUT DIFFERENT THINGS: 1
4. TROUBLE RELAXING: 0
2. NOT BEING ABLE TO STOP OR CONTROL WORRYING: 0
6. BECOMING EASILY ANNOYED OR IRRITABLE: 0
IF YOU CHECKED OFF ANY PROBLEMS ON THIS QUESTIONNAIRE, HOW DIFFICULT HAVE THESE PROBLEMS MADE IT FOR YOU TO DO YOUR WORK, TAKE CARE OF THINGS AT HOME, OR GET ALONG WITH OTHER PEOPLE: SOMEWHAT DIFFICULT
5. BEING SO RESTLESS THAT IT IS HARD TO SIT STILL: 0
7. FEELING AFRAID AS IF SOMETHING AWFUL MIGHT HAPPEN: 0

## 2022-12-12 DIAGNOSIS — I10 ESSENTIAL HYPERTENSION: Primary | ICD-10-CM

## 2022-12-16 DIAGNOSIS — I10 ESSENTIAL HYPERTENSION: ICD-10-CM

## 2022-12-16 LAB
ALBUMIN SERPL-MCNC: 3.8 G/DL (ref 3.2–4.6)
ALBUMIN/GLOB SERPL: 1.4 (ref 0.4–1.6)
ALP SERPL-CCNC: 119 U/L (ref 50–136)
ALT SERPL-CCNC: 32 U/L (ref 12–65)
ANION GAP SERPL CALC-SCNC: 5 MMOL/L (ref 2–11)
AST SERPL-CCNC: 15 U/L (ref 15–37)
BASOPHILS # BLD: 0 K/UL (ref 0–0.2)
BASOPHILS NFR BLD: 1 % (ref 0–2)
BILIRUB SERPL-MCNC: 0.4 MG/DL (ref 0.2–1.1)
BUN SERPL-MCNC: 16 MG/DL (ref 8–23)
CALCIUM SERPL-MCNC: 9.8 MG/DL (ref 8.3–10.4)
CHLORIDE SERPL-SCNC: 107 MMOL/L (ref 101–110)
CO2 SERPL-SCNC: 27 MMOL/L (ref 21–32)
CREAT SERPL-MCNC: 0.9 MG/DL (ref 0.6–1)
DIFFERENTIAL METHOD BLD: NORMAL
EOSINOPHIL # BLD: 0.1 K/UL (ref 0–0.8)
EOSINOPHIL NFR BLD: 2 % (ref 0.5–7.8)
ERYTHROCYTE [DISTWIDTH] IN BLOOD BY AUTOMATED COUNT: 12.1 % (ref 11.9–14.6)
GLOBULIN SER CALC-MCNC: 2.8 G/DL (ref 2.8–4.5)
GLUCOSE SERPL-MCNC: 118 MG/DL (ref 65–100)
HCT VFR BLD AUTO: 41.5 % (ref 35.8–46.3)
HGB BLD-MCNC: 13.7 G/DL (ref 11.7–15.4)
IMM GRANULOCYTES # BLD AUTO: 0 K/UL (ref 0–0.5)
IMM GRANULOCYTES NFR BLD AUTO: 0 % (ref 0–5)
LYMPHOCYTES # BLD: 1.7 K/UL (ref 0.5–4.6)
LYMPHOCYTES NFR BLD: 31 % (ref 13–44)
MCH RBC QN AUTO: 31.4 PG (ref 26.1–32.9)
MCHC RBC AUTO-ENTMCNC: 33 G/DL (ref 31.4–35)
MCV RBC AUTO: 95.2 FL (ref 82–102)
MONOCYTES # BLD: 0.4 K/UL (ref 0.1–1.3)
MONOCYTES NFR BLD: 7 % (ref 4–12)
NEUTS SEG # BLD: 3.3 K/UL (ref 1.7–8.2)
NEUTS SEG NFR BLD: 59 % (ref 43–78)
NRBC # BLD: 0 K/UL (ref 0–0.2)
PLATELET # BLD AUTO: 243 K/UL (ref 150–450)
PMV BLD AUTO: 9.7 FL (ref 9.4–12.3)
POTASSIUM SERPL-SCNC: 4.3 MMOL/L (ref 3.5–5.1)
PROT SERPL-MCNC: 6.6 G/DL (ref 6.3–8.2)
RBC # BLD AUTO: 4.36 M/UL (ref 4.05–5.2)
SODIUM SERPL-SCNC: 139 MMOL/L (ref 133–143)
TSH W FREE THYROID IF ABNORMAL: 1.29 UIU/ML (ref 0.36–3.74)
WBC # BLD AUTO: 5.5 K/UL (ref 4.3–11.1)

## 2022-12-21 NOTE — PROGRESS NOTES
[facility] Internal Medicine  [address], [address]    INDIVIDUAL THERAPY NOTE    NAME: Mary Knowles    DATE: 12/8/2022    TYPE OF SERVICE: Individual Therapy    LOCATION OF SERVICE: In Office    TOTAL TIME: 60 minutes    DIAGNOSIS:    1. Schizophreniform disorder ([address]      Mental Status EXAM:  Pt appears well nourished,  Pt was appropriately dressed and groomed. Attention span was age appropriate. Insight and judgment were age appropriate. Speech pattern was even. No bizarre or psychotic behaviors were observed. Motor coordination was good. Pt.s eye content was good and manner of relating appeared developmentally within normal range. Mood and Affect: Depressed with congruent affect    Thought Process: Goal directed    PLAN: CBT may be used to address goals. Pt will decrease her symptoms of depression and anxiety by recognizing cognitive distortions and positively reframing them as evidenced by self-report and lower PHQ and YEHUDA scores. Pt is progressing on goals. Summary of Service: Patient returns for follow-up individual therapy session with provider. Pt reported ongoing auditory hallucinations occurring daily but mostly at night. The voices continue to say \"I'm going to kill you\" or \"today is your last day. \" Despite hearing the voices, Pt reported being able to sleep well once she falls asleep. Pt reported compliance with medication but feeling foggy throughout the day due to side effects. Pt drinks 5 cups of coffee to increase concentration, energy and alertness. Pt and provider discussed possible triggers for AH. 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 Pt will be retiring at the end of the year with hopes that less stress may help improve her symptoms. Provider assisted pt with learning and applying coping skills to manage the AH.  Pt has the support of her  who reminds her that the voices are not real and that she is safe in their home. Pt denied sleep and appetite disturbance. Pt denied SI/HI/VH. Follow Up: Return in about 2 weeks (around 12/22/2022). Fidel Perry on 12/20/2022 at 10:41 PM    An electronic signature was used to authenticate this note.

## 2023-01-16 ENCOUNTER — OFFICE VISIT (OUTPATIENT)
Dept: BEHAVIORAL/MENTAL HEALTH CLINIC | Age: 63
End: 2023-01-16
Payer: COMMERCIAL

## 2023-01-16 ENCOUNTER — PATIENT MESSAGE (OUTPATIENT)
Dept: BEHAVIORAL/MENTAL HEALTH CLINIC | Age: 63
End: 2023-01-16

## 2023-01-16 VITALS
HEART RATE: 60 BPM | TEMPERATURE: 98.1 F | BODY MASS INDEX: 39.56 KG/M2 | WEIGHT: 215 LBS | HEIGHT: 62 IN | DIASTOLIC BLOOD PRESSURE: 72 MMHG | OXYGEN SATURATION: 96 % | SYSTOLIC BLOOD PRESSURE: 114 MMHG

## 2023-01-16 DIAGNOSIS — F20.81 SCHIZOPHRENIFORM DISORDER (HCC): ICD-10-CM

## 2023-01-16 DIAGNOSIS — F20.81 SCHIZOPHRENIFORM DISORDER (HCC): Primary | ICD-10-CM

## 2023-01-16 PROCEDURE — 3078F DIAST BP <80 MM HG: CPT | Performed by: STUDENT IN AN ORGANIZED HEALTH CARE EDUCATION/TRAINING PROGRAM

## 2023-01-16 PROCEDURE — 99213 OFFICE O/P EST LOW 20 MIN: CPT | Performed by: STUDENT IN AN ORGANIZED HEALTH CARE EDUCATION/TRAINING PROGRAM

## 2023-01-16 PROCEDURE — 3074F SYST BP LT 130 MM HG: CPT | Performed by: STUDENT IN AN ORGANIZED HEALTH CARE EDUCATION/TRAINING PROGRAM

## 2023-01-16 ASSESSMENT — PATIENT HEALTH QUESTIONNAIRE - PHQ9
SUM OF ALL RESPONSES TO PHQ QUESTIONS 1-9: 0
SUM OF ALL RESPONSES TO PHQ9 QUESTIONS 1 & 2: 0
2. FEELING DOWN, DEPRESSED OR HOPELESS: 0
SUM OF ALL RESPONSES TO PHQ QUESTIONS 1-9: 0
1. LITTLE INTEREST OR PLEASURE IN DOING THINGS: 0
SUM OF ALL RESPONSES TO PHQ QUESTIONS 1-9: 0
SUM OF ALL RESPONSES TO PHQ QUESTIONS 1-9: 0
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
## 2023-01-17 RX ORDER — RISPERIDONE 2 MG/1
2 TABLET ORAL
Qty: 30 TABLET | Refills: 2 | Status: SHIPPED | OUTPATIENT
Start: 2023-01-17

## 2023-02-02 ENCOUNTER — OFFICE VISIT (OUTPATIENT)
Dept: INTERNAL MEDICINE CLINIC | Facility: CLINIC | Age: 63
End: 2023-02-02
Payer: COMMERCIAL

## 2023-02-02 VITALS
SYSTOLIC BLOOD PRESSURE: 128 MMHG | HEIGHT: 62 IN | BODY MASS INDEX: 39.75 KG/M2 | WEIGHT: 216 LBS | OXYGEN SATURATION: 98 % | DIASTOLIC BLOOD PRESSURE: 80 MMHG | TEMPERATURE: 97.1 F

## 2023-02-02 DIAGNOSIS — R05.1 ACUTE COUGH: ICD-10-CM

## 2023-02-02 DIAGNOSIS — J20.9 ACUTE BRONCHITIS, UNSPECIFIED ORGANISM: Primary | ICD-10-CM

## 2023-02-02 LAB
EXP DATE SOLUTION: NORMAL
EXP DATE SWAB: NORMAL
EXPIRATION DATE: NORMAL
LOT NUMBER POC: NORMAL
LOT NUMBER SOLUTION: NORMAL
LOT NUMBER SWAB: NORMAL
SARS-COV-2 RNA, POC: NEGATIVE

## 2023-02-02 PROCEDURE — 3074F SYST BP LT 130 MM HG: CPT | Performed by: NURSE PRACTITIONER

## 2023-02-02 PROCEDURE — 87635 SARS-COV-2 COVID-19 AMP PRB: CPT | Performed by: NURSE PRACTITIONER

## 2023-02-02 PROCEDURE — 99213 OFFICE O/P EST LOW 20 MIN: CPT | Performed by: NURSE PRACTITIONER

## 2023-02-02 PROCEDURE — 96372 THER/PROPH/DIAG INJ SC/IM: CPT | Performed by: NURSE PRACTITIONER

## 2023-02-02 PROCEDURE — 3079F DIAST BP 80-89 MM HG: CPT | Performed by: NURSE PRACTITIONER

## 2023-02-02 RX ORDER — CEFTRIAXONE 500 MG/1
500 INJECTION, POWDER, FOR SOLUTION INTRAMUSCULAR; INTRAVENOUS ONCE
Status: COMPLETED | OUTPATIENT
Start: 2023-02-02 | End: 2023-02-02

## 2023-02-02 RX ORDER — METHYLPREDNISOLONE SODIUM SUCCINATE 40 MG/ML
40 INJECTION, POWDER, LYOPHILIZED, FOR SOLUTION INTRAMUSCULAR; INTRAVENOUS ONCE
Status: COMPLETED | OUTPATIENT
Start: 2023-02-02 | End: 2023-02-02

## 2023-02-02 RX ORDER — AZITHROMYCIN 250 MG/1
250 TABLET, FILM COATED ORAL SEE ADMIN INSTRUCTIONS
Qty: 6 TABLET | Refills: 0 | Status: SHIPPED | OUTPATIENT
Start: 2023-02-02 | End: 2023-02-07

## 2023-02-02 RX ORDER — BROMPHENIRAMINE MALEATE, PSEUDOEPHEDRINE HYDROCHLORIDE, AND DEXTROMETHORPHAN HYDROBROMIDE 2; 30; 10 MG/5ML; MG/5ML; MG/5ML
5 SYRUP ORAL 4 TIMES DAILY PRN
Qty: 120 ML | Refills: 0 | Status: SHIPPED | OUTPATIENT
Start: 2023-02-02

## 2023-02-02 RX ADMIN — CEFTRIAXONE 500 MG: 500 INJECTION, POWDER, FOR SOLUTION INTRAMUSCULAR; INTRAVENOUS at 09:13

## 2023-02-02 RX ADMIN — METHYLPREDNISOLONE SODIUM SUCCINATE 40 MG: 40 INJECTION, POWDER, LYOPHILIZED, FOR SOLUTION INTRAMUSCULAR; INTRAVENOUS at 09:15

## 2023-02-02 SDOH — ECONOMIC STABILITY: INCOME INSECURITY: HOW HARD IS IT FOR YOU TO PAY FOR THE VERY BASICS LIKE FOOD, HOUSING, MEDICAL CARE, AND HEATING?: NOT HARD AT ALL

## 2023-02-02 SDOH — ECONOMIC STABILITY: FOOD INSECURITY: WITHIN THE PAST 12 MONTHS, THE FOOD YOU BOUGHT JUST DIDN'T LAST AND YOU DIDN'T HAVE MONEY TO GET MORE.: NEVER TRUE

## 2023-02-02 SDOH — ECONOMIC STABILITY: HOUSING INSECURITY
IN THE LAST 12 MONTHS, WAS THERE A TIME WHEN YOU DID NOT HAVE A STEADY PLACE TO SLEEP OR SLEPT IN A SHELTER (INCLUDING NOW)?: NO

## 2023-02-02 SDOH — ECONOMIC STABILITY: FOOD INSECURITY: WITHIN THE PAST 12 MONTHS, YOU WORRIED THAT YOUR FOOD WOULD RUN OUT BEFORE YOU GOT MONEY TO BUY MORE.: NEVER TRUE

## 2023-02-02 ASSESSMENT — PATIENT HEALTH QUESTIONNAIRE - PHQ9
SUM OF ALL RESPONSES TO PHQ QUESTIONS 1-9: 1
1. LITTLE INTEREST OR PLEASURE IN DOING THINGS: 1
SUM OF ALL RESPONSES TO PHQ QUESTIONS 1-9: 1
SUM OF ALL RESPONSES TO PHQ QUESTIONS 1-9: 1
2. FEELING DOWN, DEPRESSED OR HOPELESS: 0
SUM OF ALL RESPONSES TO PHQ9 QUESTIONS 1 & 2: 1
SUM OF ALL RESPONSES TO PHQ QUESTIONS 1-9: 1

## 2023-02-10 ENCOUNTER — NURSE ONLY (OUTPATIENT)
Dept: INTERNAL MEDICINE CLINIC | Facility: CLINIC | Age: 63
End: 2023-02-10
Payer: COMMERCIAL

## 2023-02-10 DIAGNOSIS — R53.81 MALAISE: Primary | ICD-10-CM

## 2023-02-10 DIAGNOSIS — R53.81 MALAISE AND FATIGUE: ICD-10-CM

## 2023-02-10 DIAGNOSIS — R53.83 MALAISE AND FATIGUE: ICD-10-CM

## 2023-02-10 PROCEDURE — 96372 THER/PROPH/DIAG INJ SC/IM: CPT | Performed by: NURSE PRACTITIONER

## 2023-02-10 RX ORDER — CYANOCOBALAMIN 1000 UG/ML
1000 INJECTION, SOLUTION INTRAMUSCULAR; SUBCUTANEOUS ONCE
Status: COMPLETED | OUTPATIENT
Start: 2023-02-10 | End: 2023-02-10

## 2023-02-10 RX ADMIN — CYANOCOBALAMIN 1000 MCG: 1000 INJECTION, SOLUTION INTRAMUSCULAR; SUBCUTANEOUS at 12:12

## 2023-02-13 ENCOUNTER — OFFICE VISIT (OUTPATIENT)
Dept: BEHAVIORAL/MENTAL HEALTH CLINIC | Age: 63
End: 2023-02-13
Payer: COMMERCIAL

## 2023-02-13 VITALS
TEMPERATURE: 98.4 F | DIASTOLIC BLOOD PRESSURE: 68 MMHG | SYSTOLIC BLOOD PRESSURE: 124 MMHG | BODY MASS INDEX: 39.75 KG/M2 | HEART RATE: 68 BPM | OXYGEN SATURATION: 96 % | WEIGHT: 216 LBS | HEIGHT: 62 IN

## 2023-02-13 DIAGNOSIS — F20.81 SCHIZOPHRENIFORM DISORDER (HCC): Primary | ICD-10-CM

## 2023-02-13 PROCEDURE — 3078F DIAST BP <80 MM HG: CPT | Performed by: STUDENT IN AN ORGANIZED HEALTH CARE EDUCATION/TRAINING PROGRAM

## 2023-02-13 PROCEDURE — 99213 OFFICE O/P EST LOW 20 MIN: CPT | Performed by: STUDENT IN AN ORGANIZED HEALTH CARE EDUCATION/TRAINING PROGRAM

## 2023-02-13 PROCEDURE — 3074F SYST BP LT 130 MM HG: CPT | Performed by: STUDENT IN AN ORGANIZED HEALTH CARE EDUCATION/TRAINING PROGRAM

## 2023-02-13 ASSESSMENT — PATIENT HEALTH QUESTIONNAIRE - PHQ9
SUM OF ALL RESPONSES TO PHQ QUESTIONS 1-9: 0
SUM OF ALL RESPONSES TO PHQ9 QUESTIONS 1 & 2: 0
SUM OF ALL RESPONSES TO PHQ QUESTIONS 1-9: 0
SUM OF ALL RESPONSES TO PHQ QUESTIONS 1-9: 0
1. LITTLE INTEREST OR PLEASURE IN DOING THINGS: 0
2. FEELING DOWN, DEPRESSED OR HOPELESS: 0
SUM OF ALL RESPONSES TO PHQ QUESTIONS 1-9: 0
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
## 2023-02-21 DIAGNOSIS — N32.81 OAB (OVERACTIVE BLADDER): Primary | ICD-10-CM

## 2023-02-21 RX ORDER — OXYBUTYNIN CHLORIDE 10 MG/1
10 TABLET, EXTENDED RELEASE ORAL DAILY
Qty: 90 TABLET | Refills: 1 | Status: SHIPPED | OUTPATIENT
Start: 2023-02-21

## 2023-02-22 NOTE — TELEPHONE ENCOUNTER
Pt requesting alternative to [facility] as it is not covered by insurance.     Requested Prescriptions     Signed Prescriptions Disp Refills    oxybutynin (DITROPAN XL) 10 MG extended release tablet 90 tablet 1     Sig: Take 1 tablet by mouth daily     Authorizing Provider: ADONAY Gonzalez - ROSENDA

## 2023-03-03 ENCOUNTER — TELEPHONE (OUTPATIENT)
Dept: INTERNAL MEDICINE CLINIC | Facility: CLINIC | Age: 63
End: 2023-03-03

## 2023-03-03 RX ORDER — MELOXICAM 15 MG/1
15 TABLET ORAL DAILY
Qty: 90 TABLET | Refills: 1 | Status: SHIPPED | OUTPATIENT
Start: 2023-03-03

## 2023-03-03 NOTE — TELEPHONE ENCOUNTER
Requested Prescriptions     Signed Prescriptions Disp Refills    meloxicam (MOBIC) 15 MG tablet 90 tablet 1     Sig: Take 1 tablet by mouth daily     Authorizing Provider: Ernie Nunez

## 2023-03-13 ENCOUNTER — OFFICE VISIT (OUTPATIENT)
Dept: BEHAVIORAL/MENTAL HEALTH CLINIC | Age: 63
End: 2023-03-13
Payer: COMMERCIAL

## 2023-03-13 ENCOUNTER — OFFICE VISIT (OUTPATIENT)
Dept: INTERNAL MEDICINE CLINIC | Facility: CLINIC | Age: 63
End: 2023-03-13
Payer: COMMERCIAL

## 2023-03-13 VITALS
TEMPERATURE: 98.4 F | SYSTOLIC BLOOD PRESSURE: 110 MMHG | BODY MASS INDEX: 39.75 KG/M2 | HEART RATE: 62 BPM | OXYGEN SATURATION: 96 % | DIASTOLIC BLOOD PRESSURE: 62 MMHG | HEIGHT: 62 IN | WEIGHT: 216 LBS

## 2023-03-13 VITALS
OXYGEN SATURATION: 96 % | HEIGHT: 62 IN | WEIGHT: 216 LBS | BODY MASS INDEX: 39.75 KG/M2 | SYSTOLIC BLOOD PRESSURE: 138 MMHG | DIASTOLIC BLOOD PRESSURE: 88 MMHG | HEART RATE: 99 BPM | TEMPERATURE: 97 F

## 2023-03-13 DIAGNOSIS — B02.9 HERPES ZOSTER WITHOUT COMPLICATION: Primary | ICD-10-CM

## 2023-03-13 DIAGNOSIS — F20.81 SCHIZOPHRENIFORM DISORDER (HCC): ICD-10-CM

## 2023-03-13 PROCEDURE — 3078F DIAST BP <80 MM HG: CPT | Performed by: NURSE PRACTITIONER

## 2023-03-13 PROCEDURE — 3078F DIAST BP <80 MM HG: CPT | Performed by: STUDENT IN AN ORGANIZED HEALTH CARE EDUCATION/TRAINING PROGRAM

## 2023-03-13 PROCEDURE — 3074F SYST BP LT 130 MM HG: CPT | Performed by: STUDENT IN AN ORGANIZED HEALTH CARE EDUCATION/TRAINING PROGRAM

## 2023-03-13 PROCEDURE — 99213 OFFICE O/P EST LOW 20 MIN: CPT | Performed by: NURSE PRACTITIONER

## 2023-03-13 PROCEDURE — 99213 OFFICE O/P EST LOW 20 MIN: CPT | Performed by: STUDENT IN AN ORGANIZED HEALTH CARE EDUCATION/TRAINING PROGRAM

## 2023-03-13 PROCEDURE — 3074F SYST BP LT 130 MM HG: CPT | Performed by: NURSE PRACTITIONER

## 2023-03-13 RX ORDER — RISPERIDONE 1 MG/1
1 TABLET ORAL
Qty: 30 TABLET | Refills: 2 | Status: SHIPPED | OUTPATIENT
Start: 2023-03-13

## 2023-03-13 RX ORDER — VALACYCLOVIR HYDROCHLORIDE 1 G/1
1000 TABLET, FILM COATED ORAL 2 TIMES DAILY
Qty: 14 TABLET | Refills: 0 | Status: SHIPPED | OUTPATIENT
Start: 2023-03-13 | End: 2023-03-20

## 2023-03-13 ASSESSMENT — PATIENT HEALTH QUESTIONNAIRE - PHQ9
SUM OF ALL RESPONSES TO PHQ QUESTIONS 1-9: 0
2. FEELING DOWN, DEPRESSED OR HOPELESS: 0
SUM OF ALL RESPONSES TO PHQ9 QUESTIONS 1 & 2: 0
SUM OF ALL RESPONSES TO PHQ QUESTIONS 1-9: 0
1. LITTLE INTEREST OR PLEASURE IN DOING THINGS: 0

## 2023-03-13 NOTE — PROGRESS NOTES
10/4/22 that was unremarkable. Mental Status Examination    General/Appearance: Appears stated age, Well-kept, and Appropriately attired    Behavior: cooperative, engaged    Eye Contact: good    Psychomotor: Within normal limits    Musculoskeletal: gait wnl    Speech/Language: Within normal limits    Mood: \"good\"    Affect: Appropriate, Congruent, and Full-range    Thought process: linear, goal directed, and coherent    Thought content: denies current SI/HI, A/VH, paranoia or delusions    Orientation: oriented to person, place, and time    Memory: Intact long-term and Intact short-term    Attention/Concentration: Sustained    Fund of knowledge: fair    Judgement: fair    Insight: fair         Questionnaire Findings:    PHQ2: 0 (3/13/23)    GAD7: 1 (12/8/22)         Assessment/Summary of Findings:    Alessandro Bowers is a 61 y.o. female with reported prior psychiatric history significant for recent-onset AH, paranoia who presents for medication management. They are currently prescribed: Risperdal 2 mg QHS, Trazodone 100 mg QHS. Pt reports that she has continued to do well and denies breakthrough symptoms of AH. Denies other significant symptoms of depression, anxiety, SI/HI, VH, paranoia or delusions. Discussed ongoing taper and continuing to monitor for breakthrough symptoms. Additionally, pt reports that she is also interested in reducing Trazodone at this time. Will f/u in 1 mo to assess response. Diagnosis:   Schizophreniform disorder       Plan:    Alessandro Bowers will receive medication management at [address] [place] [address],[address].     Medication Recommendations:    - Decrease Rispderal to 1 mg QHS for AH; will continue taper at next appt if symptoms remain in remission    - Decrease Trazodone to 50 mg QHS PRN (pt reports that she had been taking 100 mg QHS)    - Continue Hydroxyzine 10-20 mg TID PRN for breakthrough anxiety    - Medication side effect profiles, risks, and benefits

## 2023-03-13 NOTE — PROGRESS NOTES
Kamari Balderas (: 1960) presents today c/o rash to left cheek for a few days. She states it is stinging and burning. Chief Complaint   Patient presents with    Rash     Patient Active Problem List   Diagnosis    Mixed conductive and sensorineural hearing loss of left ear with unrestricted hearing of right ear    Urge incontinence    Inadequate sleep hygiene    Essential hypertension    Morbid obesity with BMI of 40.0-44.9, adult (HCC)    Chronic bilateral low back pain without sciatica    Bladder pain    Severe obesity with body mass index (BMI) of 35.0 to 39.9 with serious comorbidity (HCC)    Obesity (BMI 30-39.9)    JOVANI (obstructive sleep apnea)    Gastroesophageal reflux disease without esophagitis    Sleep apnea with use of continuous positive airway pressure (CPAP)    Auditory hallucinations    Chronic sinusitis    Vitamin D deficiency    VIVIAN (stress urinary incontinence, female)    Weakness of pelvic floor    OAB (overactive bladder)      Reviewed and updated this visit by provider:  Tobacco  Allergies  Meds  Problems  Med Hx  Surg Hx  Fam Hx       Immunizations:  Immunization status: up to date and documented.     Review of Systems - Negative except as stated in HPI  History obtained from chart review and the patient  General ROS: negative for - fever  Dermatological ROS: positive for - rash    Visit Vitals  /88 (Site: Left Upper Arm, Position: Sitting)   Pulse 99   Temp 97 °F (36.1 °C) (Temporal)   Ht 5' 2\" (1.575 m)   Wt 216 lb (98 kg)   SpO2 96%   BMI 39.51 kg/m²     Physical Examination: General appearance - alert, well appearing, and in no distress, oriented to person, place, and time, and overweight  Mental status - alert, oriented to person, place, and time, normal mood, behavior, speech, dress, motor activity, and thought processes, affect appropriate to mood  Mouth - oropharynx clear  Skin - DERMATITIS NOTED: herpes zoster of left cheek - fluid filled vesicles in dermatomal pattern    Assessment/Plan:  1. Herpes zoster without complication      Orders Placed This Encounter   Medications    valACYclovir (VALTREX) 1 g tablet     Sig: Take 1 tablet by mouth 2 times daily for 7 days     Dispense:  14 tablet     Refill:  0     Valtrex as prescribed - direction for use and side effects discussed. Keep dry; avoid itching/scratching the affected area. Avoid close contact. Return if symptoms worsen or fail to improve.     Joshua Wilcox, APRN - CNP

## 2023-03-30 DIAGNOSIS — F51.04 CHRONIC INSOMNIA: Primary | ICD-10-CM

## 2023-03-30 RX ORDER — TRAZODONE HYDROCHLORIDE 50 MG/1
25-50 TABLET ORAL NIGHTLY PRN
Qty: 30 TABLET | Refills: 5 | Status: SHIPPED | OUTPATIENT
Start: 2023-03-30

## 2023-05-03 ENCOUNTER — TRANSCRIBE ORDERS (OUTPATIENT)
Dept: SCHEDULING | Age: 63
End: 2023-05-03

## 2023-05-03 DIAGNOSIS — Z12.31 SCREENING MAMMOGRAM FOR HIGH-RISK PATIENT: Primary | ICD-10-CM

## 2023-05-16 ENCOUNTER — OFFICE VISIT (OUTPATIENT)
Dept: BEHAVIORAL/MENTAL HEALTH CLINIC | Age: 63
End: 2023-05-16
Payer: COMMERCIAL

## 2023-05-16 VITALS — HEART RATE: 71 BPM | DIASTOLIC BLOOD PRESSURE: 78 MMHG | OXYGEN SATURATION: 94 % | SYSTOLIC BLOOD PRESSURE: 122 MMHG

## 2023-05-16 DIAGNOSIS — F20.81 SCHIZOPHRENIFORM DISORDER (HCC): ICD-10-CM

## 2023-05-16 DIAGNOSIS — F41.9 ANXIETY DISORDER, UNSPECIFIED TYPE: ICD-10-CM

## 2023-05-16 PROCEDURE — 3078F DIAST BP <80 MM HG: CPT | Performed by: STUDENT IN AN ORGANIZED HEALTH CARE EDUCATION/TRAINING PROGRAM

## 2023-05-16 PROCEDURE — 99214 OFFICE O/P EST MOD 30 MIN: CPT | Performed by: STUDENT IN AN ORGANIZED HEALTH CARE EDUCATION/TRAINING PROGRAM

## 2023-05-16 PROCEDURE — 3074F SYST BP LT 130 MM HG: CPT | Performed by: STUDENT IN AN ORGANIZED HEALTH CARE EDUCATION/TRAINING PROGRAM

## 2023-05-16 RX ORDER — RISPERIDONE 2 MG/1
2 TABLET ORAL
Qty: 30 TABLET | Refills: 2 | Status: SHIPPED | OUTPATIENT
Start: 2023-05-16

## 2023-05-16 RX ORDER — HYDROXYZINE HYDROCHLORIDE 10 MG/1
TABLET, FILM COATED ORAL
Qty: 90 TABLET | Refills: 2 | Status: SHIPPED | OUTPATIENT
Start: 2023-05-16

## 2023-05-16 ASSESSMENT — PATIENT HEALTH QUESTIONNAIRE - PHQ9
2. FEELING DOWN, DEPRESSED OR HOPELESS: 0
SUM OF ALL RESPONSES TO PHQ QUESTIONS 1-9: 0
SUM OF ALL RESPONSES TO PHQ QUESTIONS 1-9: 0
1. LITTLE INTEREST OR PLEASURE IN DOING THINGS: 0
SUM OF ALL RESPONSES TO PHQ9 QUESTIONS 1 & 2: 0
SUM OF ALL RESPONSES TO PHQ QUESTIONS 1-9: 0
SUM OF ALL RESPONSES TO PHQ QUESTIONS 1-9: 0

## 2023-05-16 NOTE — PROGRESS NOTES
Susie       Patient Name: Author Bruon    Patient : 1960    Patient MRN: [id number]    Insurance: [employer]    Primary Language: English      Date of Service: 2023    Type of Service: Medication management    Other Services Involved: N/A       Phone Number: [phone]   Emergency Contact: Joe Foster, , [phone]       Chief Complaint: \"Not good\"      History of Present Illness    Author Damion is a 61 y.o. female with reported prior psychiatric history significant for recent-onset AH, paranoia who presents for medication management. They are currently prescribed: Risperdal 1 mg QHS, Trazodone 25 mg QHS. Presents with  for collateral.    Pt reports that she weaned off Risperdal last month and noticed a recurrence of her AH that began last week.  notes that the pt's symptoms have worsened since the same neighbor got a dog, as the barking between their dog and the neighbor's dog. Pt states she has also felt more worried since her  returned to work in . Overall, pt symptoms appear to worsen during times of stress. She reports that she restarted Risperdal 1 mg two nights ago. Denies SI/HI, VH, paranoia or delusions. Last Visit (3/13/23): Pt reports that she has continued to do well and denies any significant changes. Continues to report that she has not experienced recurrent AH since last appt and even saw the neighbor that she would hear AH of last week without breakthrough symptoms. Denies other significant symptoms of depression, anxiety, SI/HI, A/VH, paranoia or delusions. Expressed that she is also interested in reducing Trazodone (reportedly taking 100 mg, not 50 mg QHS). Psychiatric Review of Systems    Depression: Denies depressed mood and anhedonia. Denies prior hx of depression. Denies current or prior SI/HI.     Anxiety: excessive anxiety and worry, feelings of restlessness, difficulty

## 2023-05-24 ENCOUNTER — HOSPITAL ENCOUNTER (OUTPATIENT)
Dept: MAMMOGRAPHY | Age: 63
Discharge: HOME | End: 2023-05-27
Payer: COMMERCIAL

## 2023-05-24 DIAGNOSIS — Z12.31 SCREENING MAMMOGRAM FOR HIGH-RISK PATIENT: ICD-10-CM

## 2023-05-24 PROCEDURE — 77063 BREAST TOMOSYNTHESIS BI: CPT

## 2023-05-25 DIAGNOSIS — R92.8 ABNORMAL MAMMOGRAM OF LEFT BREAST: Primary | ICD-10-CM

## 2023-05-30 ENCOUNTER — OFFICE VISIT (OUTPATIENT)
Dept: INTERNAL MEDICINE CLINIC | Facility: CLINIC | Age: 63
End: 2023-05-30
Payer: COMMERCIAL

## 2023-05-30 VITALS
HEIGHT: 62 IN | BODY MASS INDEX: 40.48 KG/M2 | SYSTOLIC BLOOD PRESSURE: 132 MMHG | DIASTOLIC BLOOD PRESSURE: 88 MMHG | WEIGHT: 220 LBS

## 2023-05-30 DIAGNOSIS — Z00.00 ROUTINE GENERAL MEDICAL EXAMINATION AT A HEALTH CARE FACILITY: Primary | ICD-10-CM

## 2023-05-30 LAB
ALBUMIN SERPL-MCNC: 3.9 G/DL (ref 3.2–4.6)
ALBUMIN/GLOB SERPL: 1.3 (ref 0.4–1.6)
ALP SERPL-CCNC: 134 U/L (ref 50–136)
ALT SERPL-CCNC: 26 U/L (ref 12–65)
ANION GAP SERPL CALC-SCNC: 5 MMOL/L (ref 2–11)
AST SERPL-CCNC: 18 U/L (ref 15–37)
BASOPHILS # BLD: 0 K/UL (ref 0–0.2)
BASOPHILS NFR BLD: 0 % (ref 0–2)
BILIRUB SERPL-MCNC: 0.2 MG/DL (ref 0.2–1.1)
BUN SERPL-MCNC: 19 MG/DL (ref 8–23)
CALCIUM SERPL-MCNC: 9.7 MG/DL (ref 8.3–10.4)
CHLORIDE SERPL-SCNC: 107 MMOL/L (ref 101–110)
CHOLEST SERPL-MCNC: 191 MG/DL
CO2 SERPL-SCNC: 27 MMOL/L (ref 21–32)
CREAT SERPL-MCNC: 0.9 MG/DL (ref 0.6–1)
DIFFERENTIAL METHOD BLD: ABNORMAL
EOSINOPHIL # BLD: 0.2 K/UL (ref 0–0.8)
EOSINOPHIL NFR BLD: 4 % (ref 0.5–7.8)
ERYTHROCYTE [DISTWIDTH] IN BLOOD BY AUTOMATED COUNT: 13.1 % (ref 11.9–14.6)
EST. AVERAGE GLUCOSE BLD GHB EST-MCNC: 117 MG/DL
GLOBULIN SER CALC-MCNC: 3.1 G/DL (ref 2.8–4.5)
GLUCOSE SERPL-MCNC: 130 MG/DL (ref 65–100)
HBA1C MFR BLD: 5.7 % (ref 4.8–5.6)
HCT VFR BLD AUTO: 37.2 % (ref 35.8–46.3)
HDLC SERPL-MCNC: 69 MG/DL (ref 40–60)
HDLC SERPL: 2.8
HGB BLD-MCNC: 12.2 G/DL (ref 11.7–15.4)
IMM GRANULOCYTES # BLD AUTO: 0 K/UL (ref 0–0.5)
IMM GRANULOCYTES NFR BLD AUTO: 1 % (ref 0–5)
LDLC SERPL CALC-MCNC: 97.6 MG/DL
LYMPHOCYTES # BLD: 1.4 K/UL (ref 0.5–4.6)
LYMPHOCYTES NFR BLD: 25 % (ref 13–44)
MCH RBC QN AUTO: 31.6 PG (ref 26.1–32.9)
MCHC RBC AUTO-ENTMCNC: 32.8 G/DL (ref 31.4–35)
MCV RBC AUTO: 96.4 FL (ref 82–102)
MONOCYTES # BLD: 0.3 K/UL (ref 0.1–1.3)
MONOCYTES NFR BLD: 6 % (ref 4–12)
NEUTS SEG # BLD: 3.7 K/UL (ref 1.7–8.2)
NEUTS SEG NFR BLD: 64 % (ref 43–78)
NRBC # BLD: 0 K/UL (ref 0–0.2)
PLATELET # BLD AUTO: 278 K/UL (ref 150–450)
PMV BLD AUTO: 9.5 FL (ref 9.4–12.3)
POTASSIUM SERPL-SCNC: 4.6 MMOL/L (ref 3.5–5.1)
PROT SERPL-MCNC: 7 G/DL (ref 6.3–8.2)
RBC # BLD AUTO: 3.86 M/UL (ref 4.05–5.2)
SODIUM SERPL-SCNC: 139 MMOL/L (ref 133–143)
TRIGL SERPL-MCNC: 122 MG/DL (ref 35–150)
TSH, 3RD GENERATION: 1.94 UIU/ML (ref 0.36–3.74)
VLDLC SERPL CALC-MCNC: 24.4 MG/DL (ref 6–23)
WBC # BLD AUTO: 5.7 K/UL (ref 4.3–11.1)

## 2023-05-30 PROCEDURE — 99396 PREV VISIT EST AGE 40-64: CPT | Performed by: NURSE PRACTITIONER

## 2023-05-30 PROCEDURE — 36415 COLL VENOUS BLD VENIPUNCTURE: CPT | Performed by: NURSE PRACTITIONER

## 2023-05-30 PROCEDURE — 3075F SYST BP GE 130 - 139MM HG: CPT | Performed by: NURSE PRACTITIONER

## 2023-05-30 PROCEDURE — 3079F DIAST BP 80-89 MM HG: CPT | Performed by: NURSE PRACTITIONER

## 2023-05-30 ASSESSMENT — ENCOUNTER SYMPTOMS
NAUSEA: 0
BACK PAIN: 0
COUGH: 0
SHORTNESS OF BREATH: 0
EYE PAIN: 0
ABDOMINAL PAIN: 0
ABDOMINAL DISTENTION: 0
EYE REDNESS: 0
VOMITING: 0
COLOR CHANGE: 0
SINUS PRESSURE: 0
WHEEZING: 0
CONSTIPATION: 0
BLOOD IN STOOL: 0
SINUS PAIN: 0
DIARRHEA: 0

## 2023-05-31 ENCOUNTER — PATIENT MESSAGE (OUTPATIENT)
Dept: BEHAVIORAL/MENTAL HEALTH CLINIC | Age: 63
End: 2023-05-31

## 2023-05-31 DIAGNOSIS — F20.81 SCHIZOPHRENIFORM DISORDER (HCC): ICD-10-CM

## 2023-06-02 NOTE — TELEPHONE ENCOUNTER
Khoi, Processor 6/2/2023 3:23 PM EDT    It would help if I can have a prescription for 4mg, so I do not have to take so many bottles. Let me know if that is possible. Thank you.

## 2023-06-04 RX ORDER — RISPERIDONE 4 MG/1
4 TABLET ORAL
Qty: 30 TABLET | Refills: 2 | Status: SHIPPED | OUTPATIENT
Start: 2023-06-04

## 2023-07-06 ENCOUNTER — OFFICE VISIT (OUTPATIENT)
Dept: BEHAVIORAL/MENTAL HEALTH CLINIC | Age: 63
End: 2023-07-06
Payer: COMMERCIAL

## 2023-07-06 VITALS — SYSTOLIC BLOOD PRESSURE: 114 MMHG | HEART RATE: 70 BPM | DIASTOLIC BLOOD PRESSURE: 78 MMHG | OXYGEN SATURATION: 98 %

## 2023-07-06 DIAGNOSIS — F20.81 SCHIZOPHRENIFORM DISORDER (HCC): Primary | ICD-10-CM

## 2023-07-06 PROCEDURE — 99213 OFFICE O/P EST LOW 20 MIN: CPT | Performed by: STUDENT IN AN ORGANIZED HEALTH CARE EDUCATION/TRAINING PROGRAM

## 2023-07-06 PROCEDURE — 3078F DIAST BP <80 MM HG: CPT | Performed by: STUDENT IN AN ORGANIZED HEALTH CARE EDUCATION/TRAINING PROGRAM

## 2023-07-06 PROCEDURE — 3074F SYST BP LT 130 MM HG: CPT | Performed by: STUDENT IN AN ORGANIZED HEALTH CARE EDUCATION/TRAINING PROGRAM

## 2023-07-06 ASSESSMENT — PATIENT HEALTH QUESTIONNAIRE - PHQ9
SUM OF ALL RESPONSES TO PHQ QUESTIONS 1-9: 0
2. FEELING DOWN, DEPRESSED OR HOPELESS: 0
SUM OF ALL RESPONSES TO PHQ QUESTIONS 1-9: 0
SUM OF ALL RESPONSES TO PHQ9 QUESTIONS 1 & 2: 0
1. LITTLE INTEREST OR PLEASURE IN DOING THINGS: 0

## 2023-07-06 NOTE — PROGRESS NOTES
[address] [address]      Patient Name: Travis Drew    Patient : 1960    Patient MRN: [id number]    Insurance: [facility]    Primary Language: English      Date of Service: 2023    Type of Service: Medication management    Other Services Involved: N/A       Phone Number: [phone]   Emergency Contact: Trinidad Gutierrez, , [phone]       Chief Complaint: \"It's ok\"      History of Present Illness    Dickenson Marcia Aguiar is a 61 y.o. female with reported prior psychiatric history significant for recent-onset AH, paranoia who presents for medication management. They are currently prescribed: Risperdal 4 mg QHS, Trazodone 25 mg QHS. Presents with  for collateral.    Pt reports that the voices has somewhat improved following further titration of Risperdal, expressing that severity is roughly a 2/10. Continues to experience mild AH, though they are not as bothersome and improved after roughly 3-4 days of increased dose. However, she notes that she is not sleeping as well and sometimes use Advil PM PRN. Denies SI/HI, VH, paranoia or delusions. Last Visit (23): Pt reports that she weaned off Risperdal last month and noticed a recurrence of her AH that began last week.  notes that the pt's symptoms have worsened since the same neighbor got a dog, as the barking between their dog and the neighbor's dog. Pt states she has also felt more worried since her  returned to work in . Overall, pt symptoms appear to worsen during times of stress. She reports that she restarted Risperdal 1 mg two nights ago. Denies SI/HI, VH, paranoia or delusions. Psychiatric Review of Systems    Depression: Denies depressed mood and anhedonia. Denies prior hx of depression. Denies current or prior SI/HI.     Anxiety: excessive anxiety and worry, feelings of restlessness, difficulty concentrating, irritability, and sleep disturbance    Hypomania/carey:

## 2023-07-18 ENCOUNTER — HOSPITAL ENCOUNTER (OUTPATIENT)
Dept: MAMMOGRAPHY | Age: 63
Discharge: HOME | End: 2023-07-21
Attending: INTERNAL MEDICINE
Payer: COMMERCIAL

## 2023-07-18 DIAGNOSIS — R92.8 ABNORMAL MAMMOGRAM OF LEFT BREAST: ICD-10-CM

## 2023-07-18 PROCEDURE — G0279 TOMOSYNTHESIS, MAMMO: HCPCS

## 2023-07-24 ENCOUNTER — TELEPHONE (OUTPATIENT)
Dept: INTERNAL MEDICINE CLINIC | Facility: CLINIC | Age: 63
End: 2023-07-24

## 2023-07-24 DIAGNOSIS — I10 ESSENTIAL HYPERTENSION: Primary | ICD-10-CM

## 2023-07-24 RX ORDER — AMLODIPINE BESYLATE 2.5 MG/1
2.5 TABLET ORAL DAILY
Qty: 90 TABLET | Refills: 3 | Status: SHIPPED | OUTPATIENT
Start: 2023-07-24

## 2023-07-24 NOTE — TELEPHONE ENCOUNTER
Requested Prescriptions     Signed Prescriptions Disp Refills    amLODIPine (NORVASC) 2.5 MG tablet 90 tablet 3     Sig: Take 1 tablet by mouth daily TAKE ONE TABLET BY MOUTH ONE TIME DAILY FOR HIGH BLOOD PRESSURE     Authorizing Provider: Josselin Becerril

## 2023-08-31 DIAGNOSIS — N32.81 OAB (OVERACTIVE BLADDER): ICD-10-CM

## 2023-08-31 DIAGNOSIS — F20.81 SCHIZOPHRENIFORM DISORDER (HCC): ICD-10-CM

## 2023-08-31 RX ORDER — RISPERIDONE 4 MG/1
TABLET ORAL
Qty: 30 TABLET | Refills: 0 | Status: SHIPPED | OUTPATIENT
Start: 2023-08-31 | End: 2023-09-08 | Stop reason: SINTOL

## 2023-08-31 RX ORDER — OXYBUTYNIN CHLORIDE 10 MG/1
10 TABLET, EXTENDED RELEASE ORAL DAILY
Qty: 90 TABLET | Refills: 1 | Status: SHIPPED | OUTPATIENT
Start: 2023-08-31

## 2023-08-31 NOTE — TELEPHONE ENCOUNTER
Requested Prescriptions     Signed Prescriptions Disp Refills    oxybutynin (DITROPAN XL) 10 MG extended release tablet 90 tablet 1     Sig: Take 1 tablet by mouth daily     Authorizing Provider: Antoni Ogden

## 2023-09-01 ENCOUNTER — TELEPHONE (OUTPATIENT)
Dept: INTERNAL MEDICINE CLINIC | Facility: CLINIC | Age: 63
End: 2023-09-01

## 2023-09-01 DIAGNOSIS — F25.9 SCHIZOAFFECTIVE DISORDER, UNSPECIFIED TYPE (HCC): Primary | ICD-10-CM

## 2023-09-01 NOTE — TELEPHONE ENCOUNTER
Pt needs a referral to [facility].     I will place the referral. Cartilage Graft Text: The defect edges were debeveled with a #15 scalpel blade.  Given the location of the defect, shape of the defect, the fact the defect involved a full thickness cartilage defect a cartilage graft was deemed most appropriate.  An appropriate donor site was identified, cleansed, and anesthetized. The cartilage graft was then harvested and transferred to the recipient site, oriented appropriately and then sutured into place.  The secondary defect was then repaired using a primary closure.

## 2023-09-08 ENCOUNTER — OFFICE VISIT (OUTPATIENT)
Dept: BEHAVIORAL/MENTAL HEALTH CLINIC | Age: 63
End: 2023-09-08
Payer: COMMERCIAL

## 2023-09-08 ENCOUNTER — PATIENT MESSAGE (OUTPATIENT)
Dept: BEHAVIORAL/MENTAL HEALTH CLINIC | Age: 63
End: 2023-09-08

## 2023-09-08 VITALS
BODY MASS INDEX: 41.22 KG/M2 | HEIGHT: 62 IN | WEIGHT: 224 LBS | OXYGEN SATURATION: 98 % | DIASTOLIC BLOOD PRESSURE: 80 MMHG | HEART RATE: 85 BPM | TEMPERATURE: 98 F | SYSTOLIC BLOOD PRESSURE: 120 MMHG

## 2023-09-08 DIAGNOSIS — F41.9 ANXIETY DISORDER, UNSPECIFIED TYPE: Primary | ICD-10-CM

## 2023-09-08 DIAGNOSIS — F20.0 PARANOID SCHIZOPHRENIA (HCC): Primary | ICD-10-CM

## 2023-09-08 DIAGNOSIS — F41.9 ANXIETY DISORDER, UNSPECIFIED TYPE: ICD-10-CM

## 2023-09-08 PROCEDURE — 3079F DIAST BP 80-89 MM HG: CPT | Performed by: STUDENT IN AN ORGANIZED HEALTH CARE EDUCATION/TRAINING PROGRAM

## 2023-09-08 PROCEDURE — 99215 OFFICE O/P EST HI 40 MIN: CPT | Performed by: STUDENT IN AN ORGANIZED HEALTH CARE EDUCATION/TRAINING PROGRAM

## 2023-09-08 PROCEDURE — 3074F SYST BP LT 130 MM HG: CPT | Performed by: STUDENT IN AN ORGANIZED HEALTH CARE EDUCATION/TRAINING PROGRAM

## 2023-09-08 RX ORDER — OLANZAPINE 10 MG/1
TABLET ORAL
Qty: 30 TABLET | Refills: 2 | Status: SHIPPED | OUTPATIENT
Start: 2023-09-08

## 2023-09-08 RX ORDER — LORAZEPAM 0.5 MG/1
TABLET ORAL
Qty: 30 TABLET | Refills: 1 | Status: SHIPPED | OUTPATIENT
Start: 2023-09-08 | End: 2023-10-08

## 2023-09-08 ASSESSMENT — PATIENT HEALTH QUESTIONNAIRE - PHQ9
SUM OF ALL RESPONSES TO PHQ9 QUESTIONS 1 & 2: 1
SUM OF ALL RESPONSES TO PHQ QUESTIONS 1-9: 1
2. FEELING DOWN, DEPRESSED OR HOPELESS: 1
SUM OF ALL RESPONSES TO PHQ QUESTIONS 1-9: 1
1. LITTLE INTEREST OR PLEASURE IN DOING THINGS: 0
SUM OF ALL RESPONSES TO PHQ QUESTIONS 1-9: 1
SUM OF ALL RESPONSES TO PHQ QUESTIONS 1-9: 1

## 2023-09-08 NOTE — PROGRESS NOTES
[address] [address]      Patient Name: Pepe Mix    Patient : 1960    Patient MRN: [id number]    Insurance: [facility]    Primary Language: English      Date of Service: 2023    Type of Service: Medication management    Other Services Involved: N/A       Phone Number: [phone]   Emergency Contact: Bon Rosales, , [phone]       Chief Complaint: \"It's not good\"      History of Present Illness    Ranjan Welch is a 61 y.o. female with reported prior psychiatric history significant for recent-onset AH, paranoia who presents for medication management. They are currently prescribed: Risperdal 4 mg QHS. Presents with  for collateral.    Pt reports that she continues to experience significant AH at night that are frightening in nature. Expresses that she is \"terrified to go outside\" and often perceives that she is in danger due to neighbor.  reports that he has never heard these same concerns. She presents as intermittently tearful when discussing the impact of her avoidance behaviors on daily life. States that AH are primarily at night, though she may sometimes hear music during the day, and often interfere with sleep. Denies SI/HI, A/VH, paranoia or delusions. States that Risperdal has made her feel more sluggish/tired during the day, though this is likely exacerbated by insomnia. Last Visit (23): Pt reports that the voices has somewhat improved following further titration of Risperdal, expressing that severity is roughly a 2/10. Continues to experience mild AH, though they are not as bothersome and improved after roughly 3-4 days of increased dose. However, she notes that she is not sleeping as well and sometimes use Advil PM PRN. Denies SI/HI, VH, paranoia or delusions. Psychiatric Review of Systems    Depression: Denies depressed mood and anhedonia. Denies prior hx of depression. Denies current or prior SI/HI.     Anxiety:

## 2023-09-08 NOTE — PATIENT INSTRUCTIONS
Decrease Risperdal to 2 mg at night for one week, then stop    2.   Start Zyprexa 5 mg (1/2 tablet) for one to two weeks, then increase to full tablet as tolerated

## 2023-10-02 RX ORDER — LORAZEPAM 1 MG/1
1 TABLET ORAL DAILY PRN
Qty: 30 TABLET | Refills: 2 | Status: SHIPPED | OUTPATIENT
Start: 2023-10-02 | End: 2023-12-31

## 2023-10-06 ENCOUNTER — OFFICE VISIT (OUTPATIENT)
Dept: BEHAVIORAL/MENTAL HEALTH CLINIC | Age: 63
End: 2023-10-06
Payer: COMMERCIAL

## 2023-10-06 VITALS
HEART RATE: 69 BPM | OXYGEN SATURATION: 97 % | HEIGHT: 62 IN | BODY MASS INDEX: 41.22 KG/M2 | WEIGHT: 224 LBS | SYSTOLIC BLOOD PRESSURE: 118 MMHG | DIASTOLIC BLOOD PRESSURE: 74 MMHG

## 2023-10-06 DIAGNOSIS — F20.0 PARANOID SCHIZOPHRENIA (HCC): Primary | ICD-10-CM

## 2023-10-06 PROCEDURE — 3078F DIAST BP <80 MM HG: CPT | Performed by: STUDENT IN AN ORGANIZED HEALTH CARE EDUCATION/TRAINING PROGRAM

## 2023-10-06 PROCEDURE — 99213 OFFICE O/P EST LOW 20 MIN: CPT | Performed by: STUDENT IN AN ORGANIZED HEALTH CARE EDUCATION/TRAINING PROGRAM

## 2023-10-06 PROCEDURE — 3074F SYST BP LT 130 MM HG: CPT | Performed by: STUDENT IN AN ORGANIZED HEALTH CARE EDUCATION/TRAINING PROGRAM

## 2023-10-09 ENCOUNTER — OFFICE VISIT (OUTPATIENT)
Dept: INTERNAL MEDICINE CLINIC | Facility: CLINIC | Age: 63
End: 2023-10-09
Payer: COMMERCIAL

## 2023-10-09 VITALS
OXYGEN SATURATION: 99 % | TEMPERATURE: 96.8 F | SYSTOLIC BLOOD PRESSURE: 124 MMHG | WEIGHT: 221 LBS | DIASTOLIC BLOOD PRESSURE: 78 MMHG | HEIGHT: 62 IN | HEART RATE: 67 BPM | BODY MASS INDEX: 40.67 KG/M2

## 2023-10-09 DIAGNOSIS — N32.81 OAB (OVERACTIVE BLADDER): ICD-10-CM

## 2023-10-09 DIAGNOSIS — T30.0 BURN: ICD-10-CM

## 2023-10-09 DIAGNOSIS — G47.33 OSA (OBSTRUCTIVE SLEEP APNEA): ICD-10-CM

## 2023-10-09 DIAGNOSIS — F25.9 SCHIZOAFFECTIVE DISORDER, UNSPECIFIED TYPE (HCC): ICD-10-CM

## 2023-10-09 DIAGNOSIS — R73.03 PREDIABETES: ICD-10-CM

## 2023-10-09 DIAGNOSIS — Z23 NEED FOR IMMUNIZATION AGAINST INFLUENZA: ICD-10-CM

## 2023-10-09 DIAGNOSIS — T30.0 BURN: Primary | ICD-10-CM

## 2023-10-09 DIAGNOSIS — I10 ESSENTIAL HYPERTENSION: ICD-10-CM

## 2023-10-09 LAB
ALBUMIN SERPL-MCNC: 3.9 G/DL (ref 3.2–4.6)
ALBUMIN/GLOB SERPL: 1 (ref 0.4–1.6)
ALP SERPL-CCNC: 129 U/L (ref 50–136)
ALT SERPL-CCNC: 28 U/L (ref 12–65)
ANION GAP SERPL CALC-SCNC: 7 MMOL/L (ref 2–11)
AST SERPL-CCNC: 14 U/L (ref 15–37)
BASOPHILS # BLD: 0 K/UL (ref 0–0.2)
BASOPHILS NFR BLD: 0 % (ref 0–2)
BILIRUB SERPL-MCNC: 0.4 MG/DL (ref 0.2–1.1)
BUN SERPL-MCNC: 18 MG/DL (ref 8–23)
CALCIUM SERPL-MCNC: 10.2 MG/DL (ref 8.3–10.4)
CHLORIDE SERPL-SCNC: 111 MMOL/L (ref 101–110)
CHOLEST SERPL-MCNC: 187 MG/DL
CO2 SERPL-SCNC: 27 MMOL/L (ref 21–32)
CREAT SERPL-MCNC: 1 MG/DL (ref 0.6–1)
DIFFERENTIAL METHOD BLD: NORMAL
EOSINOPHIL # BLD: 0.1 K/UL (ref 0–0.8)
EOSINOPHIL NFR BLD: 1 % (ref 0.5–7.8)
ERYTHROCYTE [DISTWIDTH] IN BLOOD BY AUTOMATED COUNT: 12.7 % (ref 11.9–14.6)
EST. AVERAGE GLUCOSE BLD GHB EST-MCNC: 123 MG/DL
GLOBULIN SER CALC-MCNC: 3.9 G/DL (ref 2.8–4.5)
GLUCOSE SERPL-MCNC: 110 MG/DL (ref 65–100)
HBA1C MFR BLD: 5.9 % (ref 4.8–5.6)
HCT VFR BLD AUTO: 44.6 % (ref 35.8–46.3)
HDLC SERPL-MCNC: 48 MG/DL (ref 40–60)
HDLC SERPL: 3.9
HGB BLD-MCNC: 14.9 G/DL (ref 11.7–15.4)
IMM GRANULOCYTES # BLD AUTO: 0 K/UL (ref 0–0.5)
IMM GRANULOCYTES NFR BLD AUTO: 0 % (ref 0–5)
LDLC SERPL CALC-MCNC: 99.2 MG/DL
LYMPHOCYTES # BLD: 1.6 K/UL (ref 0.5–4.6)
LYMPHOCYTES NFR BLD: 23 % (ref 13–44)
MCH RBC QN AUTO: 30.5 PG (ref 26.1–32.9)
MCHC RBC AUTO-ENTMCNC: 33.4 G/DL (ref 31.4–35)
MCV RBC AUTO: 91.2 FL (ref 82–102)
MONOCYTES # BLD: 0.5 K/UL (ref 0.1–1.3)
MONOCYTES NFR BLD: 7 % (ref 4–12)
NEUTS SEG # BLD: 4.7 K/UL (ref 1.7–8.2)
NEUTS SEG NFR BLD: 69 % (ref 43–78)
NRBC # BLD: 0 K/UL (ref 0–0.2)
PLATELET # BLD AUTO: 286 K/UL (ref 150–450)
PMV BLD AUTO: 9.6 FL (ref 9.4–12.3)
POTASSIUM SERPL-SCNC: 4.3 MMOL/L (ref 3.5–5.1)
PROT SERPL-MCNC: 7.8 G/DL (ref 6.3–8.2)
RBC # BLD AUTO: 4.89 M/UL (ref 4.05–5.2)
SODIUM SERPL-SCNC: 145 MMOL/L (ref 133–143)
TRIGL SERPL-MCNC: 199 MG/DL (ref 35–150)
TSH, 3RD GENERATION: 2.43 UIU/ML (ref 0.36–3.74)
VLDLC SERPL CALC-MCNC: 39.8 MG/DL (ref 6–23)
WBC # BLD AUTO: 7 K/UL (ref 4.3–11.1)

## 2023-10-09 PROCEDURE — 3074F SYST BP LT 130 MM HG: CPT | Performed by: NURSE PRACTITIONER

## 2023-10-09 PROCEDURE — 90471 IMMUNIZATION ADMIN: CPT | Performed by: NURSE PRACTITIONER

## 2023-10-09 PROCEDURE — 99213 OFFICE O/P EST LOW 20 MIN: CPT | Performed by: NURSE PRACTITIONER

## 2023-10-09 PROCEDURE — 90674 CCIIV4 VAC NO PRSV 0.5 ML IM: CPT | Performed by: NURSE PRACTITIONER

## 2023-10-09 PROCEDURE — 3078F DIAST BP <80 MM HG: CPT | Performed by: NURSE PRACTITIONER

## 2023-11-15 ENCOUNTER — NURSE ONLY (OUTPATIENT)
Dept: INTERNAL MEDICINE CLINIC | Facility: CLINIC | Age: 63
End: 2023-11-15
Payer: COMMERCIAL

## 2023-11-15 DIAGNOSIS — R53.81 MALAISE AND FATIGUE: Primary | ICD-10-CM

## 2023-11-15 DIAGNOSIS — R53.83 MALAISE AND FATIGUE: Primary | ICD-10-CM

## 2023-11-15 PROCEDURE — 96372 THER/PROPH/DIAG INJ SC/IM: CPT | Performed by: NURSE PRACTITIONER

## 2023-11-15 RX ORDER — CYANOCOBALAMIN 1000 UG/ML
1000 INJECTION, SOLUTION INTRAMUSCULAR; SUBCUTANEOUS ONCE
Status: COMPLETED | OUTPATIENT
Start: 2023-11-15 | End: 2023-11-15

## 2023-11-15 RX ORDER — AMLODIPINE BESYLATE 2.5 MG/1
2.5 TABLET ORAL DAILY
COMMUNITY
Start: 2023-10-28

## 2023-11-15 RX ADMIN — CYANOCOBALAMIN 1000 MCG: 1000 INJECTION, SOLUTION INTRAMUSCULAR; SUBCUTANEOUS at 11:30

## 2023-11-24 ASSESSMENT — PATIENT HEALTH QUESTIONNAIRE - PHQ9
SUM OF ALL RESPONSES TO PHQ QUESTIONS 1-9: 0
2. FEELING DOWN, DEPRESSED OR HOPELESS: 0
SUM OF ALL RESPONSES TO PHQ QUESTIONS 1-9: 0
SUM OF ALL RESPONSES TO PHQ QUESTIONS 1-9: 0
1. LITTLE INTEREST OR PLEASURE IN DOING THINGS: 0
SUM OF ALL RESPONSES TO PHQ QUESTIONS 1-9: 0
SUM OF ALL RESPONSES TO PHQ9 QUESTIONS 1 & 2: 0

## 2023-12-08 ENCOUNTER — OFFICE VISIT (OUTPATIENT)
Dept: BEHAVIORAL/MENTAL HEALTH CLINIC | Age: 63
End: 2023-12-08
Payer: COMMERCIAL

## 2023-12-08 DIAGNOSIS — F20.0 PARANOID SCHIZOPHRENIA (HCC): Primary | ICD-10-CM

## 2023-12-08 PROCEDURE — 99215 OFFICE O/P EST HI 40 MIN: CPT | Performed by: STUDENT IN AN ORGANIZED HEALTH CARE EDUCATION/TRAINING PROGRAM

## 2023-12-08 RX ORDER — OLANZAPINE 15 MG/1
15 TABLET ORAL NIGHTLY
Qty: 30 TABLET | Refills: 2 | Status: SHIPPED | OUTPATIENT
Start: 2023-12-08

## 2023-12-08 NOTE — PROGRESS NOTES
[address] [address]      Patient Name: Massimo Gonzalez    Patient : 1960    Patient MRN: [id number]    Insurance: [facility]    Primary Language: English      Date of Service: 2023    Type of Service: Medication management    Other Services Involved: N/A       Phone Number: [phone]   Emergency Contact: Zoe Valerio, , [phone]       Chief Complaint: \"I'm better, but it still affects me\"      History of Present Illness    Massimo Gonzalez is a 61 y.o. female with reported prior psychiatric history significant for recent-onset AH, paranoia who presents for medication management. They are currently prescribed: Zyprexa 10 mg QHS, Ativan 1 mg QHS. Presents with  for collateral.    Pt reports that she feels better overall with Zyprexa, but continues to experience breakthrough AH. States that she decided to text her neighbor to apologize, but hasn't heard back. Pt reports she is primarily distressed by the content of the [address], including paranoia regarding safety. Provided psychoeducation on hallucinations and validated pt experience. She denies current SI/HI, A/VH, paranoia or delusions, noting that she tends to not experience the [address] when she is outside of the home. Last Visit (10/6/23): Pt reports that she saw some improvement following initial trial of Zyprexa 5 mg QHS, but continued to experience mild breakthrough symptoms. However, once she increase to full 10 mg tablet the AH have mostly resolved. Reports that she has been sleeping well with addition of Ativan 1 mg QHS, but denies grogginess during the day. Reports compliance and denies significant SE. Denies SI/HI, A/VH, paranoia or delusions. No other concerns at this time. Psychiatric Review of Systems    Depression: Denies depressed mood and anhedonia. Denies prior hx of depression. Denies current or prior SI/HI.     Anxiety: excessive anxiety and worry, feelings of restlessness,

## 2023-12-12 DIAGNOSIS — F20.0 PARANOID SCHIZOPHRENIA (HCC): Primary | ICD-10-CM

## 2023-12-28 DIAGNOSIS — F41.9 ANXIETY DISORDER, UNSPECIFIED TYPE: ICD-10-CM

## 2023-12-29 ENCOUNTER — PATIENT MESSAGE (OUTPATIENT)
Dept: BEHAVIORAL/MENTAL HEALTH CLINIC | Age: 63
End: 2023-12-29

## 2023-12-29 ENCOUNTER — TELEPHONE (OUTPATIENT)
Dept: FAMILY MEDICINE CLINIC | Facility: CLINIC | Age: 63
End: 2023-12-29

## 2023-12-29 DIAGNOSIS — F41.9 ANXIETY DISORDER, UNSPECIFIED TYPE: ICD-10-CM

## 2023-12-29 RX ORDER — LORAZEPAM 1 MG/1
TABLET ORAL
Qty: 30 TABLET | Refills: 2 | OUTPATIENT
Start: 2023-12-29

## 2023-12-29 RX ORDER — LORAZEPAM 1 MG/1
1 TABLET ORAL DAILY PRN
Qty: 30 TABLET | Refills: 2 | OUTPATIENT
Start: 2023-12-29 | End: 2024-03-28

## 2023-12-29 NOTE — TELEPHONE ENCOUNTER
Pt states that [facility] states she needs another rx request for her Lorazepam, although the rx back in October states there are two refills.  Pt states she only has one left.  Please send refill to the pharmacy on file if possible, and call pt at [phone] with any problems, thank you!

## 2023-12-30 DIAGNOSIS — F41.9 ANXIETY DISORDER, UNSPECIFIED TYPE: ICD-10-CM

## 2023-12-30 RX ORDER — LORAZEPAM 1 MG/1
TABLET ORAL
Qty: 30 TABLET | Refills: 2 | OUTPATIENT
Start: 2023-12-30

## 2023-12-30 RX ORDER — LORAZEPAM 1 MG/1
1 TABLET ORAL DAILY PRN
Qty: 30 TABLET | Refills: 2 | Status: SHIPPED | OUTPATIENT
Start: 2023-12-30 | End: 2024-03-29

## 2023-12-30 NOTE — TELEPHONE ENCOUNTER
From: Martinez Pizarro  To: Dr. Ismael Crabtree: 12/29/2023 1:07 PM EST  Subject: Lorazepam    Hi,    Just called [facility] and they have no new prescription on file. My bottle is dated 11/30 and I have 1 left. There has to be some mix up somewhere. Please help!

## 2024-01-03 ENCOUNTER — OFFICE VISIT (OUTPATIENT)
Dept: BEHAVIORAL/MENTAL HEALTH CLINIC | Facility: CLINIC | Age: 64
End: 2024-01-03
Payer: COMMERCIAL

## 2024-01-03 DIAGNOSIS — F20.0 PARANOID SCHIZOPHRENIA (HCC): Primary | ICD-10-CM

## 2024-01-03 PROCEDURE — 90834 PSYTX W PT 45 MINUTES: CPT | Performed by: COUNSELOR

## 2024-01-03 ASSESSMENT — ANXIETY QUESTIONNAIRES
2. NOT BEING ABLE TO STOP OR CONTROL WORRYING: 1
6. BECOMING EASILY ANNOYED OR IRRITABLE: 1
7. FEELING AFRAID AS IF SOMETHING AWFUL MIGHT HAPPEN: 3
1. FEELING NERVOUS, ANXIOUS, OR ON EDGE: 0
GAD7 TOTAL SCORE: 8
5. BEING SO RESTLESS THAT IT IS HARD TO SIT STILL: 1
IF YOU CHECKED OFF ANY PROBLEMS ON THIS QUESTIONNAIRE, HOW DIFFICULT HAVE THESE PROBLEMS MADE IT FOR YOU TO DO YOUR WORK, TAKE CARE OF THINGS AT HOME, OR GET ALONG WITH OTHER PEOPLE: EXTREMELY DIFFICULT
4. TROUBLE RELAXING: 1
3. WORRYING TOO MUCH ABOUT DIFFERENT THINGS: 1

## 2024-01-03 ASSESSMENT — PATIENT HEALTH QUESTIONNAIRE - PHQ9
SUM OF ALL RESPONSES TO PHQ QUESTIONS 1-9: 5
SUM OF ALL RESPONSES TO PHQ QUESTIONS 1-9: 5
8. MOVING OR SPEAKING SO SLOWLY THAT OTHER PEOPLE COULD HAVE NOTICED. OR THE OPPOSITE, BEING SO FIGETY OR RESTLESS THAT YOU HAVE BEEN MOVING AROUND A LOT MORE THAN USUAL: 1
SUM OF ALL RESPONSES TO PHQ QUESTIONS 1-9: 5
7. TROUBLE CONCENTRATING ON THINGS, SUCH AS READING THE NEWSPAPER OR WATCHING TELEVISION: 1
9. THOUGHTS THAT YOU WOULD BE BETTER OFF DEAD, OR OF HURTING YOURSELF: 0
5. POOR APPETITE OR OVEREATING: 0
3. TROUBLE FALLING OR STAYING ASLEEP: 0
4. FEELING TIRED OR HAVING LITTLE ENERGY: 1
1. LITTLE INTEREST OR PLEASURE IN DOING THINGS: 2
2. FEELING DOWN, DEPRESSED OR HOPELESS: 0
6. FEELING BAD ABOUT YOURSELF - OR THAT YOU ARE A FAILURE OR HAVE LET YOURSELF OR YOUR FAMILY DOWN: 0
10. IF YOU CHECKED OFF ANY PROBLEMS, HOW DIFFICULT HAVE THESE PROBLEMS MADE IT FOR YOU TO DO YOUR WORK, TAKE CARE OF THINGS AT HOME, OR GET ALONG WITH OTHER PEOPLE: 0
SUM OF ALL RESPONSES TO PHQ QUESTIONS 1-9: 5
SUM OF ALL RESPONSES TO PHQ9 QUESTIONS 1 & 2: 2

## 2024-01-03 NOTE — PROGRESS NOTES
[facility]  [address]  [place] [address]    INDIVIDUAL THERAPY NOTE    NAME: Zach Castillo    DATE: 1/3/2024    TYPE OF SERVICE: Individual Therapy    LOCATION OF SERVICE: In Office    TOTAL TIME: 45 minutes    DIAGNOSIS:    1. Paranoid schizophrenia (HCC)      MENTAL STATUS EXAM:  Pt appears well nourished,  Pt was appropriately dressed and groomed.  Attention span was age appropriate. Insight and judgment were age appropriate.  Speech pattern was even.  No bizarre or psychotic behaviors were observed. Motor coordination was good. Pt.s eye content was good and manner of relating appeared developmentally within normal range.    MOOD AND AFFECT: Depressed/paranoid/tired/sleep deprived with congruent affect    THOUGHT PROCESS: Goal-directed and logical    PLAN: CBT may be used to address goals.    Pt is progressing on goals.    Pt will decrease her symptoms of depression and anxiety by recognizing cognitive distortions and positively reframing them as evidenced by self-report and lower PHQ and YEHUDA scores.         SUMMARY OF SERVICE: Patient returns for follow-up individual therapy session with provider. Pt has not been seen since 12/2022. Provider administered PHQ-9 and YEHUDA-7 assessments to pt. Pt's PHQ-9 and YEHUDA-7 scores are 5 and 8 respectively indicating mild depression and mild anxiety. Provider assisted pt with processing sources/triggers for current symptoms. Pt continues to experience auditory hallucinations from her neighbor which she believes to be real. Pt denies experiencing any command hallucinations especially those that are instructing her to perform any specific acts that may be threatening to herself or anyone else. Pt reported the hallucinations occur more at night and interfere with her ability to sleep. Pt reported spending some nights at her daughter's home to avoid the hallucinations. Pt shared that she hears music only when she is at her daughter's

## 2024-01-29 ENCOUNTER — OFFICE VISIT (OUTPATIENT)
Dept: BEHAVIORAL/MENTAL HEALTH CLINIC | Age: 64
End: 2024-01-29
Payer: COMMERCIAL

## 2024-01-29 VITALS
HEART RATE: 66 BPM | DIASTOLIC BLOOD PRESSURE: 67 MMHG | WEIGHT: 218 LBS | OXYGEN SATURATION: 94 % | SYSTOLIC BLOOD PRESSURE: 122 MMHG | BODY MASS INDEX: 39.87 KG/M2

## 2024-01-29 DIAGNOSIS — F20.0 PARANOID SCHIZOPHRENIA (HCC): Primary | ICD-10-CM

## 2024-01-29 PROCEDURE — 3074F SYST BP LT 130 MM HG: CPT | Performed by: STUDENT IN AN ORGANIZED HEALTH CARE EDUCATION/TRAINING PROGRAM

## 2024-01-29 PROCEDURE — 3078F DIAST BP <80 MM HG: CPT | Performed by: STUDENT IN AN ORGANIZED HEALTH CARE EDUCATION/TRAINING PROGRAM

## 2024-01-29 PROCEDURE — 99213 OFFICE O/P EST LOW 20 MIN: CPT | Performed by: STUDENT IN AN ORGANIZED HEALTH CARE EDUCATION/TRAINING PROGRAM

## 2024-01-29 NOTE — PROGRESS NOTES
[facility]      Patient Name: Zach Castillo    Patient : 1960    Patient MRN: [id number]    Insurance: [facility]    Primary Language: English      Date of Service: 2024    Type of Service: Medication management    Other Services Involved: N/A       Phone Number: [phone]   Emergency Contact: Kalyan , [phone]       Chief Complaint: \"I'm good\"      History of Present Illness    Zach Castillo is a 64 y.o. female with reported prior psychiatric history significant for recent-onset AH, paranoia who presents for medication management. They are currently prescribed: Zyprexa 15 mg QHS, Ativan 1 mg QHS. Presents with  for collateral.    Pt reports that she has been feeling better and has seen a reduction in frequency of AH, noting that it has improved over the past week. Does not necessarily think that she has noticed a significant difference, however, since increasing Zyprexa to 15 mg. Recently reestablished with [facility] for therapy. Reports compliance and denies significant SE. Denies acute or current SI/HI, A/VH, paranoia or delusions at this time.      Last Visit (23):  Pt reports that she feels better overall with Zyprexa, but continues to experience breakthrough AH. States that she decided to text her neighbor to apologize, but hasn't heard back. Pt reports she is primarily distressed by the content of the AH, including paranoia regarding safety. Provided psychoeducation on hallucinations and validated pt experience. She denies current SI/HI, A/VH, paranoia or delusions, noting that she tends to not experience the AH when she is outside of the home.      Psychiatric Review of Systems    Depression: Denies depressed mood and anhedonia. Denies prior hx of depression. Denies current or prior SI/HI.    Anxiety: excessive anxiety and worry, feelings of restlessness, difficulty concentrating, irritability, and sleep

## 2024-01-31 ASSESSMENT — PATIENT HEALTH QUESTIONNAIRE - PHQ9
2. FEELING DOWN, DEPRESSED OR HOPELESS: NOT AT ALL
2. FEELING DOWN, DEPRESSED OR HOPELESS: 0
SUM OF ALL RESPONSES TO PHQ9 QUESTIONS 1 & 2: 0
SUM OF ALL RESPONSES TO PHQ QUESTIONS 1-9: 0
SUM OF ALL RESPONSES TO PHQ9 QUESTIONS 1 & 2: 0
1. LITTLE INTEREST OR PLEASURE IN DOING THINGS: 0
1. LITTLE INTEREST OR PLEASURE IN DOING THINGS: NOT AT ALL
SUM OF ALL RESPONSES TO PHQ QUESTIONS 1-9: 0

## 2024-01-31 NOTE — PROGRESS NOTES
Zach Castillo (: 1960) presents today for six month recheck.  She saw Dr. Rios (psychiatry) this week, she states. She reduced her Ativan to 1/2 tablet Qhs and is not sleeping as well now with auditory hallucinations since reducing the dose.  She c/o left shoulder pain, which is chronic for years but getting worse lately.    Chief Complaint   Patient presents with    Follow-up     6 month f/u      Patient Active Problem List   Diagnosis    Mixed conductive and sensorineural hearing loss of left ear with unrestricted hearing of right ear    Urge incontinence    Inadequate sleep hygiene    Essential hypertension    Morbid obesity with BMI of 40.0-44.9, adult ([facility])    Chronic bilateral low back pain without sciatica    Bladder pain    Severe obesity with body mass index (BMI) of 35.0 to 39.9 with serious comorbidity ([facility])    Obesity (BMI 30-39.9)    JOVANI (obstructive sleep apnea)    Gastroesophageal reflux disease without esophagitis    Sleep apnea with use of continuous positive airway pressure (CPAP)    Auditory hallucinations    Chronic sinusitis    Vitamin D deficiency    VIVIAN (stress urinary incontinence, female)    Weakness of pelvic floor    OAB (overactive bladder)    Prediabetes    Paranoid schizophrenia ([facility])      Reviewed and updated this visit by provider:  Tobacco  Allergies  Meds  Problems  Med Hx  Surg Hx  Fam Hx       Review of Systems - Negative except as stated in HPI  History obtained from spouse (Kalyan), chart review, and the patient  General ROS: positive for  - sleep disturbance  negative for - fever  Psychological ROS: positive for - sleep disturbances  negative for - anxiety or depression  Respiratory ROS: no cough, shortness of breath, or wheezing  Cardiovascular ROS: no chest pain or dyspnea on exertion  Gastrointestinal ROS: no abdominal pain, change in bowel habits, or black or bloody stools  positive for - constipation  Genito-Urinary ROS: no dysuria, trouble

## 2024-02-01 ENCOUNTER — OFFICE VISIT (OUTPATIENT)
Dept: INTERNAL MEDICINE CLINIC | Facility: CLINIC | Age: 64
End: 2024-02-01
Payer: COMMERCIAL

## 2024-02-01 VITALS
HEART RATE: 67 BPM | OXYGEN SATURATION: 97 % | BODY MASS INDEX: 41.81 KG/M2 | HEIGHT: 62 IN | WEIGHT: 227.2 LBS | DIASTOLIC BLOOD PRESSURE: 78 MMHG | SYSTOLIC BLOOD PRESSURE: 110 MMHG | TEMPERATURE: 97.3 F

## 2024-02-01 DIAGNOSIS — G89.29 CHRONIC LEFT SHOULDER PAIN: Chronic | ICD-10-CM

## 2024-02-01 DIAGNOSIS — F20.0 PARANOID SCHIZOPHRENIA (HCC): ICD-10-CM

## 2024-02-01 DIAGNOSIS — K59.09 CHRONIC CONSTIPATION: Primary | Chronic | ICD-10-CM

## 2024-02-01 DIAGNOSIS — G47.33 OSA (OBSTRUCTIVE SLEEP APNEA): ICD-10-CM

## 2024-02-01 DIAGNOSIS — I10 ESSENTIAL HYPERTENSION: ICD-10-CM

## 2024-02-01 DIAGNOSIS — M25.512 CHRONIC LEFT SHOULDER PAIN: Chronic | ICD-10-CM

## 2024-02-01 LAB
ALBUMIN SERPL-MCNC: 3.8 G/DL (ref 3.2–4.6)
ALBUMIN/GLOB SERPL: 1.1 (ref 0.4–1.6)
ALP SERPL-CCNC: 143 U/L (ref 50–136)
ALT SERPL-CCNC: 21 U/L (ref 12–65)
ANION GAP SERPL CALC-SCNC: 3 MMOL/L (ref 2–11)
AST SERPL-CCNC: 16 U/L (ref 15–37)
BILIRUB SERPL-MCNC: 0.3 MG/DL (ref 0.2–1.1)
BUN SERPL-MCNC: 14 MG/DL (ref 8–23)
CALCIUM SERPL-MCNC: 10 MG/DL (ref 8.3–10.4)
CHLORIDE SERPL-SCNC: 109 MMOL/L (ref 103–113)
CO2 SERPL-SCNC: 27 MMOL/L (ref 21–32)
CREAT SERPL-MCNC: 1 MG/DL (ref 0.6–1)
GLOBULIN SER CALC-MCNC: 3.4 G/DL (ref 2.8–4.5)
GLUCOSE SERPL-MCNC: 145 MG/DL (ref 65–100)
POTASSIUM SERPL-SCNC: 4.1 MMOL/L (ref 3.5–5.1)
PROT SERPL-MCNC: 7.2 G/DL (ref 6.3–8.2)
SODIUM SERPL-SCNC: 139 MMOL/L (ref 136–146)

## 2024-02-01 PROCEDURE — 99214 OFFICE O/P EST MOD 30 MIN: CPT | Performed by: NURSE PRACTITIONER

## 2024-02-01 PROCEDURE — 3074F SYST BP LT 130 MM HG: CPT | Performed by: NURSE PRACTITIONER

## 2024-02-01 PROCEDURE — 3078F DIAST BP <80 MM HG: CPT | Performed by: NURSE PRACTITIONER

## 2024-02-01 RX ORDER — DOCUSATE SODIUM 100 MG/1
100 CAPSULE, LIQUID FILLED ORAL 2 TIMES DAILY
COMMUNITY
Start: 2024-02-01 | End: 2024-03-02

## 2024-03-03 ENCOUNTER — TELEPHONE (OUTPATIENT)
Dept: INTERNAL MEDICINE CLINIC | Facility: CLINIC | Age: 64
End: 2024-03-03

## 2024-03-03 DIAGNOSIS — N32.81 OAB (OVERACTIVE BLADDER): Primary | ICD-10-CM

## 2024-03-03 RX ORDER — OXYBUTYNIN CHLORIDE 10 MG/1
10 TABLET, EXTENDED RELEASE ORAL DAILY
Qty: 90 TABLET | Refills: 1 | Status: SHIPPED | OUTPATIENT
Start: 2024-03-03

## 2024-03-04 ENCOUNTER — OFFICE VISIT (OUTPATIENT)
Dept: BEHAVIORAL/MENTAL HEALTH CLINIC | Age: 64
End: 2024-03-04
Payer: COMMERCIAL

## 2024-03-04 VITALS
DIASTOLIC BLOOD PRESSURE: 72 MMHG | TEMPERATURE: 98.1 F | SYSTOLIC BLOOD PRESSURE: 126 MMHG | OXYGEN SATURATION: 90 % | HEART RATE: 75 BPM

## 2024-03-04 DIAGNOSIS — F20.0 PARANOID SCHIZOPHRENIA (HCC): Primary | ICD-10-CM

## 2024-03-04 PROCEDURE — 3074F SYST BP LT 130 MM HG: CPT | Performed by: STUDENT IN AN ORGANIZED HEALTH CARE EDUCATION/TRAINING PROGRAM

## 2024-03-04 PROCEDURE — 99213 OFFICE O/P EST LOW 20 MIN: CPT | Performed by: STUDENT IN AN ORGANIZED HEALTH CARE EDUCATION/TRAINING PROGRAM

## 2024-03-04 PROCEDURE — 3078F DIAST BP <80 MM HG: CPT | Performed by: STUDENT IN AN ORGANIZED HEALTH CARE EDUCATION/TRAINING PROGRAM

## 2024-03-04 ASSESSMENT — PATIENT HEALTH QUESTIONNAIRE - PHQ9
2. FEELING DOWN, DEPRESSED OR HOPELESS: 0
SUM OF ALL RESPONSES TO PHQ QUESTIONS 1-9: 0

## 2024-03-04 NOTE — PROGRESS NOTES
[facility]      Patient Name: Zach Castillo    Patient : 1960    Patient MRN: [id number]    Insurance: [facility]    Primary Language: English      Date of Service: 3/4/2024    Type of Service: Medication management    Other Services Involved: N/A       Phone Number: [phone]   Emergency Contact: Kalyan, , [phone]       Chief Complaint: \"I'm doing good\"      History of Present Illness    Zach Castillo is a 64 y.o. female with reported prior psychiatric history significant for recent-onset AH, paranoia who presents for medication management. They are currently prescribed: Zyprexa 15 mg QHS, Ativan 0.5 mg QHS. Presents with  for collateral.    Pt reports that she has been doing well overall, but notes that she often feels tired during the day and will take naps (may be 2/2 Zyprexa). She attempted to reduce Ativan to 0.5 mg QHS, but reports that she was unable to successfully split the tablet. Reports compliance and denies other significant SE. Denies SI/HI, A/VH, paranoia or delusions.    Last Visit (24):  Pt reports that she has been feeling better and has seen a reduction in frequency of AH, noting that it has improved over the past week. Does not necessarily think that she has noticed a significant difference, however, since increasing Zyprexa to 15 mg. Recently reestablished with [facility] for therapy. Reports compliance and denies significant SE. Denies acute or current SI/HI, A/VH, paranoia or delusions at this time.      Psychiatric Review of Systems    Depression: Denies depressed mood and anhedonia. Denies prior hx of depression. Denies current or prior SI/HI.    Anxiety: excessive anxiety and worry, feelings of restlessness, difficulty concentrating, irritability, and sleep disturbance    Hypomania/carey: Denies inflated self-esteem or grandiosity, decreased need for sleep, more talkative or pressured speech, flight of

## 2024-03-09 DIAGNOSIS — F20.0 PARANOID SCHIZOPHRENIA (HCC): ICD-10-CM

## 2024-03-11 DIAGNOSIS — F20.0 PARANOID SCHIZOPHRENIA (HCC): ICD-10-CM

## 2024-03-11 RX ORDER — OLANZAPINE 15 MG/1
15 TABLET ORAL NIGHTLY
Qty: 90 TABLET | Refills: 1 | OUTPATIENT
Start: 2024-03-11

## 2024-03-11 RX ORDER — OLANZAPINE 15 MG/1
15 TABLET ORAL NIGHTLY
Qty: 90 TABLET | Refills: 1 | Status: SHIPPED | OUTPATIENT
Start: 2024-03-11

## 2024-03-28 DIAGNOSIS — F41.9 ANXIETY DISORDER, UNSPECIFIED TYPE: ICD-10-CM

## 2024-03-28 RX ORDER — LORAZEPAM 1 MG/1
1 TABLET ORAL DAILY PRN
Qty: 30 TABLET | Refills: 2 | Status: SHIPPED | OUTPATIENT
Start: 2024-03-28 | End: 2024-06-26

## 2024-03-28 RX ORDER — LORAZEPAM 1 MG/1
TABLET ORAL
Qty: 30 TABLET | Refills: 2 | OUTPATIENT
Start: 2024-03-28
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
## 2024-05-06 ENCOUNTER — TELEMEDICINE (OUTPATIENT)
Dept: BEHAVIORAL/MENTAL HEALTH CLINIC | Age: 64
End: 2024-05-06
Payer: COMMERCIAL

## 2024-05-06 DIAGNOSIS — F20.0 PARANOID SCHIZOPHRENIA (HCC): Primary | ICD-10-CM

## 2024-05-06 PROCEDURE — 99213 OFFICE O/P EST LOW 20 MIN: CPT | Performed by: STUDENT IN AN ORGANIZED HEALTH CARE EDUCATION/TRAINING PROGRAM

## 2024-05-06 ASSESSMENT — PATIENT HEALTH QUESTIONNAIRE - PHQ9
2. FEELING DOWN, DEPRESSED OR HOPELESS: NOT AT ALL
SUM OF ALL RESPONSES TO PHQ QUESTIONS 1-9: 0
SUM OF ALL RESPONSES TO PHQ QUESTIONS 1-9: 0
SUM OF ALL RESPONSES TO PHQ9 QUESTIONS 1 & 2: 0
1. LITTLE INTEREST OR PLEASURE IN DOING THINGS: NOT AT ALL
SUM OF ALL RESPONSES TO PHQ QUESTIONS 1-9: 0
SUM OF ALL RESPONSES TO PHQ QUESTIONS 1-9: 0

## 2024-05-06 NOTE — PROGRESS NOTES
[facility]      Patient Name: Zach Castillo    Patient : 1960    Patient MRN: [id number]    Insurance: [facility]    Primary Language: English      Date of Service: 2024    Type of Service: Medication management    Other Services Involved: N/A     Zach Castillo, was evaluated through a synchronous (real-time) audio-video encounter. The patient (or guardian if applicable) is aware that this is a billable service, which includes applicable co-pays. This Virtual Visit was conducted with patient's (and/or legal guardian's) consent. Patient identification was verified, and a caregiver was present when appropriate.   The patient was located at Home: [address] [place] [address]   [facility] [address]  Provider was located at Facility (Appt Dept): [address] [facility]  [address] [place], [place] [address]  Confirm you are appropriately licensed, registered, or certified to deliver care in the state where the patient is located as indicated above. If you are not or unsure, please re-schedule the visit: Yes, I confirm.       Phone Number: [phone]   Emergency Contact: Kalyan, , [phone]       Chief Complaint: \"I'm ok, just fighting a cold\"      History of Present Illness    Zach Castillo is a 64 y.o. female with reported prior psychiatric history significant for recent-onset AH, paranoia who presents for medication management. They are currently prescribed: Zyprexa 15 mg QHS, Ativan 0.5 mg QHS.    Pt reports that she has been \"better\" since last appt, expressing that she has been experiencing AH less frequently. Presents as brighter overall. Reports compliance and denies significant SE. Denies SI/HI, A/VH, paranoia or delusions.     Last Visit (3/4/24):  Pt reports that she has been doing well overall, but notes that she often feels tired during the day and will take naps (may be 2/2 Zyprexa). She attempted to reduce Ativan to 0.5 mg QHS, but

## 2024-06-26 DIAGNOSIS — F41.9 ANXIETY DISORDER, UNSPECIFIED TYPE: ICD-10-CM

## 2024-06-26 RX ORDER — LORAZEPAM 1 MG/1
TABLET ORAL
Qty: 30 TABLET | Refills: 2 | Status: SHIPPED | OUTPATIENT
Start: 2024-06-26 | End: 2024-09-24

## 2024-07-31 ENCOUNTER — OFFICE VISIT (OUTPATIENT)
Dept: INTERNAL MEDICINE CLINIC | Facility: CLINIC | Age: 64
End: 2024-07-31
Payer: COMMERCIAL

## 2024-07-31 VITALS
BODY MASS INDEX: 41.96 KG/M2 | WEIGHT: 228 LBS | SYSTOLIC BLOOD PRESSURE: 122 MMHG | OXYGEN SATURATION: 95 % | HEIGHT: 62 IN | TEMPERATURE: 97.2 F | DIASTOLIC BLOOD PRESSURE: 68 MMHG | HEART RATE: 68 BPM

## 2024-07-31 DIAGNOSIS — Z00.00 ROUTINE GENERAL MEDICAL EXAMINATION AT A HEALTH CARE FACILITY: Primary | ICD-10-CM

## 2024-07-31 DIAGNOSIS — Z00.00 ROUTINE GENERAL MEDICAL EXAMINATION AT A HEALTH CARE FACILITY: ICD-10-CM

## 2024-07-31 DIAGNOSIS — R73.03 PREDIABETES: ICD-10-CM

## 2024-07-31 DIAGNOSIS — E66.01 MORBID OBESITY WITH BMI OF 40.0-44.9, ADULT (HCC): ICD-10-CM

## 2024-07-31 DIAGNOSIS — K59.09 CHRONIC CONSTIPATION: ICD-10-CM

## 2024-07-31 DIAGNOSIS — Z12.31 VISIT FOR SCREENING MAMMOGRAM: ICD-10-CM

## 2024-07-31 DIAGNOSIS — G47.33 OSA (OBSTRUCTIVE SLEEP APNEA): ICD-10-CM

## 2024-07-31 DIAGNOSIS — I10 ESSENTIAL HYPERTENSION: ICD-10-CM

## 2024-07-31 DIAGNOSIS — F20.0 PARANOID SCHIZOPHRENIA (HCC): ICD-10-CM

## 2024-07-31 PROBLEM — E66.9 OBESITY (BMI 30-39.9): Status: RESOLVED | Noted: 2017-05-17 | Resolved: 2024-07-31

## 2024-07-31 PROBLEM — G47.30 SLEEP APNEA WITH USE OF CONTINUOUS POSITIVE AIRWAY PRESSURE (CPAP): Status: RESOLVED | Noted: 2017-11-15 | Resolved: 2024-07-31

## 2024-07-31 PROBLEM — Z72.821 INADEQUATE SLEEP HYGIENE: Status: RESOLVED | Noted: 2017-05-17 | Resolved: 2024-07-31

## 2024-07-31 LAB
ALBUMIN SERPL-MCNC: 4 G/DL (ref 3.2–4.6)
ALBUMIN/GLOB SERPL: 1.3 (ref 1–1.9)
ALP SERPL-CCNC: 136 U/L (ref 35–104)
ALT SERPL-CCNC: 34 U/L (ref 12–65)
ANION GAP SERPL CALC-SCNC: 12 MMOL/L (ref 9–18)
AST SERPL-CCNC: 31 U/L (ref 15–37)
BASOPHILS # BLD: 0 K/UL (ref 0–0.2)
BASOPHILS NFR BLD: 0 % (ref 0–2)
BILIRUB SERPL-MCNC: 0.3 MG/DL (ref 0–1.2)
BUN SERPL-MCNC: 13 MG/DL (ref 8–23)
CALCIUM SERPL-MCNC: 9.8 MG/DL (ref 8.8–10.2)
CHLORIDE SERPL-SCNC: 105 MMOL/L (ref 98–107)
CHOLEST SERPL-MCNC: 174 MG/DL (ref 0–200)
CO2 SERPL-SCNC: 23 MMOL/L (ref 20–28)
CREAT SERPL-MCNC: 0.84 MG/DL (ref 0.6–1.1)
DIFFERENTIAL METHOD BLD: ABNORMAL
EOSINOPHIL # BLD: 0 K/UL (ref 0–0.8)
EOSINOPHIL NFR BLD: 0 % (ref 0.5–7.8)
ERYTHROCYTE [DISTWIDTH] IN BLOOD BY AUTOMATED COUNT: 13.1 % (ref 11.9–14.6)
EST. AVERAGE GLUCOSE BLD GHB EST-MCNC: 116 MG/DL
GLOBULIN SER CALC-MCNC: 3 G/DL (ref 2.3–3.5)
GLUCOSE SERPL-MCNC: 159 MG/DL (ref 70–99)
HBA1C MFR BLD: 5.7 % (ref 0–5.6)
HCT VFR BLD AUTO: 45.1 % (ref 35.8–46.3)
HDLC SERPL-MCNC: 45 MG/DL (ref 40–60)
HDLC SERPL: 3.9 (ref 0–5)
HGB BLD-MCNC: 15.2 G/DL (ref 11.7–15.4)
IMM GRANULOCYTES # BLD AUTO: 0 K/UL (ref 0–0.5)
IMM GRANULOCYTES NFR BLD AUTO: 0 % (ref 0–5)
LDLC SERPL CALC-MCNC: 85 MG/DL (ref 0–100)
LYMPHOCYTES NFR BLD: 27 % (ref 13–44)
MCH RBC QN AUTO: 32.5 PG (ref 26.1–32.9)
MCHC RBC AUTO-ENTMCNC: 33.7 G/DL (ref 31.4–35)
MCV RBC AUTO: 96.6 FL (ref 82–102)
MONOCYTES # BLD: 0.5 K/UL (ref 0.1–1.3)
MONOCYTES NFR BLD: 7 % (ref 4–12)
NEUTS SEG # BLD: 4.4 K/UL (ref 1.7–8.2)
NEUTS SEG NFR BLD: 66 % (ref 43–78)
NRBC # BLD: 0 K/UL (ref 0–0.2)
PLATELET # BLD AUTO: 262 K/UL (ref 150–450)
PMV BLD AUTO: 10.4 FL (ref 9.4–12.3)
POTASSIUM SERPL-SCNC: 4.4 MMOL/L (ref 3.5–5.1)
PROT SERPL-MCNC: 7.1 G/DL (ref 6.3–8.2)
RBC # BLD AUTO: 4.67 M/UL (ref 4.05–5.2)
SODIUM SERPL-SCNC: 140 MMOL/L (ref 136–145)
TRIGL SERPL-MCNC: 221 MG/DL (ref 0–150)
TSH, 3RD GENERATION: 2.65 UIU/ML (ref 0.27–4.2)
VLDLC SERPL CALC-MCNC: 44 MG/DL (ref 6–23)
WBC # BLD AUTO: 6.7 K/UL (ref 4.3–11.1)

## 2024-07-31 PROCEDURE — 3074F SYST BP LT 130 MM HG: CPT | Performed by: NURSE PRACTITIONER

## 2024-07-31 PROCEDURE — 99396 PREV VISIT EST AGE 40-64: CPT | Performed by: NURSE PRACTITIONER

## 2024-07-31 PROCEDURE — 3078F DIAST BP <80 MM HG: CPT | Performed by: NURSE PRACTITIONER

## 2024-07-31 RX ORDER — POLYETHYLENE GLYCOL 3350 17 G/17G
17 POWDER, FOR SOLUTION ORAL DAILY PRN
COMMUNITY

## 2024-07-31 RX ORDER — AMLODIPINE BESYLATE 2.5 MG/1
2.5 TABLET ORAL DAILY
Qty: 90 TABLET | Refills: 1 | Status: SHIPPED | OUTPATIENT
Start: 2024-07-31

## 2024-07-31 SDOH — ECONOMIC STABILITY: FOOD INSECURITY: WITHIN THE PAST 12 MONTHS, YOU WORRIED THAT YOUR FOOD WOULD RUN OUT BEFORE YOU GOT MONEY TO BUY MORE.: NEVER TRUE

## 2024-07-31 SDOH — ECONOMIC STABILITY: FOOD INSECURITY: WITHIN THE PAST 12 MONTHS, THE FOOD YOU BOUGHT JUST DIDN'T LAST AND YOU DIDN'T HAVE MONEY TO GET MORE.: NEVER TRUE

## 2024-07-31 SDOH — ECONOMIC STABILITY: INCOME INSECURITY: HOW HARD IS IT FOR YOU TO PAY FOR THE VERY BASICS LIKE FOOD, HOUSING, MEDICAL CARE, AND HEATING?: NOT VERY HARD

## 2024-07-31 ASSESSMENT — PATIENT HEALTH QUESTIONNAIRE - PHQ9
SUM OF ALL RESPONSES TO PHQ QUESTIONS 1-9: 0
SUM OF ALL RESPONSES TO PHQ QUESTIONS 1-9: 0
2. FEELING DOWN, DEPRESSED OR HOPELESS: NOT AT ALL
SUM OF ALL RESPONSES TO PHQ QUESTIONS 1-9: 0
1. LITTLE INTEREST OR PLEASURE IN DOING THINGS: NOT AT ALL
SUM OF ALL RESPONSES TO PHQ9 QUESTIONS 1 & 2: 0
SUM OF ALL RESPONSES TO PHQ QUESTIONS 1-9: 0

## 2024-07-31 ASSESSMENT — ENCOUNTER SYMPTOMS
COLOR CHANGE: 0
DIARRHEA: 0
ABDOMINAL DISTENTION: 0
BLOOD IN STOOL: 0
SINUS PAIN: 0
EYE PAIN: 0
SHORTNESS OF BREATH: 0
BACK PAIN: 0
VOMITING: 0
WHEEZING: 0
COUGH: 0
SINUS PRESSURE: 0
EYE REDNESS: 0
ABDOMINAL PAIN: 0
CONSTIPATION: 1
NAUSEA: 0

## 2024-07-31 NOTE — ASSESSMENT & PLAN NOTE
On Nutri System. May consider GLP-1 in near future.   Lab Results   Component Value Date    LABA1C 5.9 (H) 10/09/2023    LABA1C 5.7 (H) 05/30/2023    LABA1C 5.5 10/12/2022     Lab Results   Component Value Date    CREATININE 1.00 02/01/2024

## 2024-07-31 NOTE — PROGRESS NOTES
2024    Zach Castillo (:  1960) is a 64 y.o. female, here for a preventive medicine evaluation.  She has been doing well. Recently started [employer]riSystem. She does want to come off her Zyprexa secondary to side effects. She states she occasionally has auditory hallucinations but not as frequent.    Subjective   Patient Active Problem List   Diagnosis    Mixed conductive and sensorineural hearing loss of left ear with unrestricted hearing of right ear    Urge incontinence    Essential hypertension    Morbid obesity with BMI of 40.0-44.9, adult ([facility])    Chronic bilateral low back pain without sciatica    JOVANI (obstructive sleep apnea)    Gastroesophageal reflux disease without esophagitis    Auditory hallucinations    Chronic sinusitis    Vitamin D deficiency    VIVIAN (stress urinary incontinence, female)    Weakness of pelvic floor    OAB (overactive bladder)    Prediabetes    Paranoid schizophrenia ([facility])    Chronic constipation       Review of Systems   Constitutional:  Negative for fatigue and fever.        Negative for - weight gain  Negative for - weight loss   HENT:  Negative for congestion, ear pain, hearing loss, postnasal drip, sinus pressure, sinus pain and tinnitus.    Eyes:  Negative for pain, redness and visual disturbance.   Respiratory:  Negative for cough, shortness of breath and wheezing.    Cardiovascular:  Negative for chest pain, palpitations and leg swelling.   Gastrointestinal:  Positive for constipation. Negative for abdominal distention, abdominal pain, blood in stool, diarrhea, nausea and vomiting.        Negative for - reflux   Genitourinary:  Negative for difficulty urinating, dysuria, flank pain, frequency, hematuria and urgency.   Musculoskeletal:  Negative for arthralgias, back pain, myalgias, neck pain and neck stiffness.        Negative for - joint pain   Skin:  Negative for color change and rash.   Allergic/Immunologic: Negative for environmental allergies and food

## 2024-07-31 NOTE — ASSESSMENT & PLAN NOTE
I have reviewed/discussed the above normal BMI with the patient.  I have recommended the following interventions: dietary management education, guidance, and counseling, encourage exercise, and monitor weight . Currently doing [employer].

## 2024-07-31 NOTE — ASSESSMENT & PLAN NOTE
Does not have pharmaceutical coverage for Linzess at present (had worked well int he past). Will continue daily Miralax. Increase water intake.  Colonoscopy is up-to-date.

## 2024-07-31 NOTE — ASSESSMENT & PLAN NOTE
Managed by psychiatry. She would like to wean off medication secondary to \"side effects\" of memory loss and distorted voice. She will discuss with Dr. Rios next week.

## 2024-08-07 ENCOUNTER — TELEMEDICINE (OUTPATIENT)
Dept: BEHAVIORAL/MENTAL HEALTH CLINIC | Age: 64
End: 2024-08-07
Payer: COMMERCIAL

## 2024-08-07 DIAGNOSIS — F20.0 PARANOID SCHIZOPHRENIA (HCC): ICD-10-CM

## 2024-08-07 PROCEDURE — 99213 OFFICE O/P EST LOW 20 MIN: CPT | Performed by: STUDENT IN AN ORGANIZED HEALTH CARE EDUCATION/TRAINING PROGRAM

## 2024-08-07 RX ORDER — OLANZAPINE 10 MG/1
10 TABLET ORAL NIGHTLY
Qty: 30 TABLET | Refills: 2 | Status: SHIPPED | OUTPATIENT
Start: 2024-08-07

## 2024-08-07 NOTE — PROGRESS NOTES
Encounters:   07/31/24 68   03/04/24 75   02/01/24 67          Lab Results:     Lab Results   Component Value Date/Time    WBC 6.7 07/31/2024 10:57 AM    HGB 15.2 07/31/2024 10:57 AM    HCT 45.1 07/31/2024 10:57 AM    MCV 96.6 07/31/2024 10:57 AM    MCV 95.9 04/02/2020 10:27 AM    MCH 32.5 07/31/2024 10:57 AM    MCH 31.7 04/02/2020 10:27 AM    MCHC 33.7 07/31/2024 10:57 AM    MCHC 33.1 04/02/2020 10:27 AM    RDW 13.1 07/31/2024 10:57 AM    MPV 10.4 07/31/2024 10:57 AM    MONOPCT 7 07/31/2024 10:57 AM    EOSPCT 0 07/31/2024 10:57 AM    BASOPCT 0 07/31/2024 10:57 AM    DIFFTYPE AUTOMATED 07/31/2024 10:57 AM         Lab Results   Component Value Date    TRIG 221 (H) 07/31/2024    HDL 45 07/31/2024    LDL 85 07/31/2024    CHOL 174 07/31/2024    GLUCOSE 159 (H) 07/31/2024        All pertinent/available labs reviewed.    CT Head performed on 10/4/22 that was unremarkable.        Mental Status Examination    General/Appearance: Appears stated age, Well-kept, and Appropriately attired    Behavior: cooperative, engaged    Eye Contact: good    Psychomotor: Within normal limits    Musculoskeletal: gait wnl    Speech/Language: Within normal limits    Mood: \"good\"    Affect: Appropriate, Congruent, and full range    Thought process: linear, goal directed, and coherent    Thought content: denies current SI/HI, A/VH, paranoia or delusions    Orientation: oriented to person, place, and time    Memory: Intact long-term and Intact short-term    Attention/Concentration: Sustained    Fund of knowledge: fair    Judgement: fair    Insight: fair         Questionnaire Findings:    PHQ2: 0 (8/6/24)    GAD7: 1 (12/8/22)         Assessment/Summary of Findings:    Zach Castillo is a 64 y.o. female with reported prior psychiatric history significant for recent-onset AH, paranoia who presents for medication management. They are currently prescribed: Zyprexa 15 mg QHS, Ativan 1 mg QHS.    Pt reports that she has continued to do well and

## 2024-09-05 ENCOUNTER — TELEPHONE (OUTPATIENT)
Dept: INTERNAL MEDICINE CLINIC | Facility: CLINIC | Age: 64
End: 2024-09-05

## 2024-09-05 DIAGNOSIS — N32.81 OAB (OVERACTIVE BLADDER): Primary | ICD-10-CM

## 2024-09-05 RX ORDER — OXYBUTYNIN CHLORIDE 10 MG/1
10 TABLET, EXTENDED RELEASE ORAL DAILY
Qty: 90 TABLET | Refills: 1 | Status: SHIPPED | OUTPATIENT
Start: 2024-09-05

## 2024-09-05 NOTE — ASSESSMENT & PLAN NOTE
A PRIOR AUTH HAS BEEN STARTED THROUGH [facility] [facility] FOR OXYCODONE.    APPROVED.    START: 09/05/2024    END: 03/05/2025    PHARMACY AND PATIENT HAVE BEEN NOTIFIED.   We discuss how sleep apnea may be related to the underlying voiding problem. Uncontrolled sleep apnea may cause nocturnal polyuria (increase in urine production at night) by release of atrial natriuretic peptide (ANP) due to negative intrathoracic pressure and stretching of the myocardium This results in increased sodium and water excretion. CPAP can result in significant reductions in ANP levels and in nocturia episodes. Cont wearing CPAP.

## 2024-09-06 NOTE — TELEPHONE ENCOUNTER
Requested Prescriptions     Signed Prescriptions Disp Refills    oxyBUTYnin (DITROPAN XL) 10 MG extended release tablet 90 tablet 1     Sig: Take 1 tablet by mouth daily     Authorizing Provider: PADUANO, JULIA S

## 2024-09-11 ENCOUNTER — TELEMEDICINE (OUTPATIENT)
Dept: BEHAVIORAL/MENTAL HEALTH CLINIC | Age: 64
End: 2024-09-11
Payer: COMMERCIAL

## 2024-09-11 DIAGNOSIS — F20.0 PARANOID SCHIZOPHRENIA (HCC): Primary | ICD-10-CM

## 2024-09-11 PROCEDURE — 99213 OFFICE O/P EST LOW 20 MIN: CPT | Performed by: STUDENT IN AN ORGANIZED HEALTH CARE EDUCATION/TRAINING PROGRAM

## 2024-09-11 RX ORDER — OLANZAPINE 5 MG/1
5 TABLET ORAL NIGHTLY
Qty: 30 TABLET | Refills: 2 | Status: SHIPPED | OUTPATIENT
Start: 2024-09-11

## 2024-09-11 ASSESSMENT — PATIENT HEALTH QUESTIONNAIRE - PHQ9
2. FEELING DOWN, DEPRESSED OR HOPELESS: NOT AT ALL
SUM OF ALL RESPONSES TO PHQ QUESTIONS 1-9: 0
SUM OF ALL RESPONSES TO PHQ QUESTIONS 1-9: 0
1. LITTLE INTEREST OR PLEASURE IN DOING THINGS: NOT AT ALL
SUM OF ALL RESPONSES TO PHQ QUESTIONS 1-9: 0
SUM OF ALL RESPONSES TO PHQ QUESTIONS 1-9: 0
SUM OF ALL RESPONSES TO PHQ9 QUESTIONS 1 & 2: 0

## 2024-09-22 DIAGNOSIS — F41.9 ANXIETY DISORDER, UNSPECIFIED TYPE: ICD-10-CM

## 2024-09-23 RX ORDER — LORAZEPAM 0.5 MG/1
0.5 TABLET ORAL NIGHTLY PRN
Qty: 30 TABLET | Refills: 2 | Status: SHIPPED | OUTPATIENT
Start: 2024-09-23 | End: 2024-12-22

## 2024-09-26 ENCOUNTER — HOSPITAL ENCOUNTER (OUTPATIENT)
Dept: MAMMOGRAPHY | Age: 64
Discharge: HOME | End: 2024-09-29

## 2024-09-26 DIAGNOSIS — Z12.31 VISIT FOR SCREENING MAMMOGRAM: ICD-10-CM

## 2024-10-09 ENCOUNTER — TELEMEDICINE (OUTPATIENT)
Dept: BEHAVIORAL/MENTAL HEALTH CLINIC | Age: 64
End: 2024-10-09
Payer: COMMERCIAL

## 2024-10-09 DIAGNOSIS — F20.0 PARANOID SCHIZOPHRENIA (HCC): Primary | ICD-10-CM

## 2024-10-09 PROCEDURE — 99213 OFFICE O/P EST LOW 20 MIN: CPT | Performed by: STUDENT IN AN ORGANIZED HEALTH CARE EDUCATION/TRAINING PROGRAM

## 2024-10-09 RX ORDER — OLANZAPINE 5 MG/1
5 TABLET ORAL NIGHTLY
Qty: 90 TABLET | Refills: 1 | Status: SHIPPED | OUTPATIENT
Start: 2024-10-09
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
## 2024-10-11 ENCOUNTER — HOSPITAL ENCOUNTER (OUTPATIENT)
Dept: MAMMOGRAPHY | Age: 64
Discharge: HOME | End: 2024-10-14
Payer: COMMERCIAL

## 2024-10-11 DIAGNOSIS — Z12.31 VISIT FOR SCREENING MAMMOGRAM: ICD-10-CM

## 2024-10-11 PROCEDURE — 77063 BREAST TOMOSYNTHESIS BI: CPT

## 2024-11-18 ENCOUNTER — TELEPHONE (OUTPATIENT)
Dept: BEHAVIORAL/MENTAL HEALTH CLINIC | Age: 64
End: 2024-11-18

## 2024-11-18 NOTE — TELEPHONE ENCOUNTER
Spoke with pt's daughter, informed we do not have FRANKLIN on file for her so we cannot give out any information. We have taken note of her concerns but cannot confirm or deny pt is seen here. If she would like to be informed about her mothers healthcare decisions we would need pt to sign documentation of that
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
## 2024-12-20 DIAGNOSIS — F41.9 ANXIETY DISORDER, UNSPECIFIED TYPE: ICD-10-CM

## 2024-12-23 ENCOUNTER — TELEPHONE (OUTPATIENT)
Dept: BEHAVIORAL/MENTAL HEALTH CLINIC | Age: 64
End: 2024-12-23

## 2024-12-23 RX ORDER — LORAZEPAM 0.5 MG/1
TABLET ORAL
Qty: 30 TABLET | Refills: 2 | OUTPATIENT
Start: 2024-12-23

## 2024-12-23 NOTE — TELEPHONE ENCOUNTER
Patient verbalized understanding and is going to look for another therapist. She will keep her appointment with you in January.

## 2024-12-24 ENCOUNTER — TELEPHONE (OUTPATIENT)
Dept: BEHAVIORAL/MENTAL HEALTH CLINIC | Age: 64
End: 2024-12-24

## 2024-12-24 DIAGNOSIS — F20.0 PARANOID SCHIZOPHRENIA (HCC): Primary | ICD-10-CM

## 2024-12-24 RX ORDER — LORAZEPAM 1 MG/1
1 TABLET ORAL DAILY PRN
Qty: 30 TABLET | Refills: 1 | Status: SHIPPED | OUTPATIENT
Start: 2024-12-24 | End: 2025-02-22

## 2024-12-24 NOTE — TELEPHONE ENCOUNTER
Patient is completely out of the medication can you please sent rx in.   Please advise  r: LORazepam (ATIVAN) 1 MG tablet [[id number]]     [id number] - [place] - [address] - P [phone] - F [phone] [address] [place] [address]  Phone: [phone]  Fax: [phone]

## 2024-12-30 ENCOUNTER — OFFICE VISIT (OUTPATIENT)
Dept: INTERNAL MEDICINE CLINIC | Facility: CLINIC | Age: 64
End: 2024-12-30
Payer: COMMERCIAL

## 2024-12-30 VITALS
DIASTOLIC BLOOD PRESSURE: 84 MMHG | TEMPERATURE: 97 F | BODY MASS INDEX: 40.3 KG/M2 | HEART RATE: 86 BPM | HEIGHT: 62 IN | WEIGHT: 219 LBS | OXYGEN SATURATION: 93 % | SYSTOLIC BLOOD PRESSURE: 122 MMHG

## 2024-12-30 DIAGNOSIS — R73.03 PREDIABETES: ICD-10-CM

## 2024-12-30 DIAGNOSIS — R53.83 MALAISE AND FATIGUE: ICD-10-CM

## 2024-12-30 DIAGNOSIS — R41.3 MEMORY LOSS: Primary | Chronic | ICD-10-CM

## 2024-12-30 DIAGNOSIS — R53.81 MALAISE AND FATIGUE: ICD-10-CM

## 2024-12-30 DIAGNOSIS — I10 ESSENTIAL HYPERTENSION: ICD-10-CM

## 2024-12-30 DIAGNOSIS — G47.33 OSA (OBSTRUCTIVE SLEEP APNEA): ICD-10-CM

## 2024-12-30 DIAGNOSIS — E55.9 VITAMIN D DEFICIENCY: ICD-10-CM

## 2024-12-30 DIAGNOSIS — F20.0 PARANOID SCHIZOPHRENIA (HCC): Chronic | ICD-10-CM

## 2024-12-30 DIAGNOSIS — K59.09 CHRONIC CONSTIPATION: ICD-10-CM

## 2024-12-30 LAB
25(OH)D3 SERPL-MCNC: 12.6 NG/ML (ref 30–100)
ALBUMIN SERPL-MCNC: 4.3 G/DL (ref 3.2–4.6)
ALBUMIN/GLOB SERPL: 1.5 (ref 1–1.9)
ALP SERPL-CCNC: 147 U/L (ref 35–104)
ALT SERPL-CCNC: 26 U/L (ref 8–45)
ANION GAP SERPL CALC-SCNC: 15 MMOL/L (ref 7–16)
AST SERPL-CCNC: 21 U/L (ref 15–37)
BASOPHILS # BLD: 0 K/UL (ref 0–0.2)
BASOPHILS NFR BLD: 0 % (ref 0–2)
BILIRUB SERPL-MCNC: 0.4 MG/DL (ref 0–1.2)
BUN SERPL-MCNC: 8 MG/DL (ref 8–23)
CALCIUM SERPL-MCNC: 10 MG/DL (ref 8.8–10.2)
CALCIUM SERPL-MCNC: 10.3 MG/DL (ref 8.8–10.2)
CHLORIDE SERPL-SCNC: 103 MMOL/L (ref 98–107)
CO2 SERPL-SCNC: 22 MMOL/L (ref 20–29)
CREAT SERPL-MCNC: 0.89 MG/DL (ref 0.6–1.1)
DIFFERENTIAL METHOD BLD: ABNORMAL
EOSINOPHIL # BLD: 0 K/UL (ref 0–0.8)
EOSINOPHIL NFR BLD: 0 % (ref 0.5–7.8)
ERYTHROCYTE [DISTWIDTH] IN BLOOD BY AUTOMATED COUNT: 12.5 % (ref 11.9–14.6)
EST. AVERAGE GLUCOSE BLD GHB EST-MCNC: 119 MG/DL
GLOBULIN SER CALC-MCNC: 2.9 G/DL (ref 2.3–3.5)
GLUCOSE SERPL-MCNC: 110 MG/DL (ref 70–99)
HBA1C MFR BLD: 5.8 % (ref 0–5.6)
HCT VFR BLD AUTO: 44 % (ref 35.8–46.3)
HGB BLD-MCNC: 15.2 G/DL (ref 11.7–15.4)
IMM GRANULOCYTES # BLD AUTO: 0 K/UL (ref 0–0.5)
IMM GRANULOCYTES NFR BLD AUTO: 0 % (ref 0–5)
LYMPHOCYTES # BLD: 1.3 K/UL (ref 0.5–4.6)
LYMPHOCYTES NFR BLD: 17 % (ref 13–44)
MCH RBC QN AUTO: 31.4 PG (ref 26.1–32.9)
MCHC RBC AUTO-ENTMCNC: 34.5 G/DL (ref 31.4–35)
MCV RBC AUTO: 90.9 FL (ref 82–102)
MONOCYTES # BLD: 0.4 K/UL (ref 0.1–1.3)
MONOCYTES NFR BLD: 6 % (ref 4–12)
NEUTS SEG # BLD: 5.9 K/UL (ref 1.7–8.2)
NEUTS SEG NFR BLD: 77 % (ref 43–78)
NRBC # BLD: 0 K/UL (ref 0–0.2)
PLATELET # BLD AUTO: 275 K/UL (ref 150–450)
PMV BLD AUTO: 9.5 FL (ref 9.4–12.3)
POTASSIUM SERPL-SCNC: 4.4 MMOL/L (ref 3.5–5.1)
PROT SERPL-MCNC: 7.2 G/DL (ref 6.3–8.2)
PTH-INTACT SERPL-MCNC: 91.1 PG/ML (ref 15–65)
RBC # BLD AUTO: 4.84 M/UL (ref 4.05–5.2)
SODIUM SERPL-SCNC: 140 MMOL/L (ref 136–145)
TSH, 3RD GENERATION: 2.21 UIU/ML (ref 0.27–4.2)
VIT B12 SERPL-MCNC: 603 PG/ML (ref 193–986)
WBC # BLD AUTO: 7.7 K/UL (ref 4.3–11.1)

## 2024-12-30 PROCEDURE — 99214 OFFICE O/P EST MOD 30 MIN: CPT | Performed by: NURSE PRACTITIONER

## 2024-12-30 PROCEDURE — 3074F SYST BP LT 130 MM HG: CPT | Performed by: NURSE PRACTITIONER

## 2024-12-30 PROCEDURE — 3079F DIAST BP 80-89 MM HG: CPT | Performed by: NURSE PRACTITIONER

## 2024-12-30 RX ORDER — MAGNESIUM GLUCONATE 27 MG(500)
500 TABLET ORAL DAILY
COMMUNITY

## 2024-12-30 ASSESSMENT — PATIENT HEALTH QUESTIONNAIRE - PHQ9
SUM OF ALL RESPONSES TO PHQ QUESTIONS 1-9: 2
2. FEELING DOWN, DEPRESSED OR HOPELESS: SEVERAL DAYS
SUM OF ALL RESPONSES TO PHQ9 QUESTIONS 1 & 2: 2
1. LITTLE INTEREST OR PLEASURE IN DOING THINGS: SEVERAL DAYS
SUM OF ALL RESPONSES TO PHQ QUESTIONS 1-9: 2

## 2024-12-30 NOTE — ASSESSMENT & PLAN NOTE
She reports ongoing issues with constipation and has started taking magnesium glycinate 50 mg daily. She will continue this regimen and monitor its effectiveness. If symptoms persist, Linzess may be considered again. A referral for a colonoscopy will be made due to her long colon and previous incomplete CT colonoscopy.  Orders:    Comprehensive Metabolic Panel; Future    CBC with Auto Differential; Future    TSH; Future    Vitamin B1, Whole Blood; Future    Vitamin B12; Future    PTH, Intact; Future    External Referral To Gastroenterology

## 2024-12-30 NOTE — ASSESSMENT & PLAN NOTE
On supplement.  Labs today - will contact with results.  Orders:    Vitamin D 25 Hydroxy; Future

## 2024-12-30 NOTE — ASSESSMENT & PLAN NOTE
Well controlled on amlodipine.  Orders:    Comprehensive Metabolic Panel; Future    CBC with Auto Differential; Future    TSH; Future    Vitamin B1, Whole Blood; Future    Vitamin B12; Future    PTH, Intact; Future

## 2024-12-30 NOTE — ASSESSMENT & PLAN NOTE
Diagnosed in 2022 (late onset).  Managed by psychiatrist; she will see him later this week.  Continue current medications; she is taking lorazepam but discontinued Zyprex about 3 weeks ago secondary to side effects.  Brain MRI ordered - will contact with results.  Orders:    MRI BRAIN WO CONTRAST; Future

## 2024-12-30 NOTE — ASSESSMENT & PLAN NOTE
Compliant and benefiting from nightly CPAP.  Orders:    Comprehensive Metabolic Panel; Future    CBC with Auto Differential; Future    TSH; Future    Vitamin B1, Whole Blood; Future    Vitamin B12; Future    PTH, Intact; Future

## 2024-12-30 NOTE — PROGRESS NOTES
Zach Orlando Anna (: 1960)     History of Present Illness  The patient is a 64-year-old female who presents for evaluation of constipation and sleep issues.    She reports an improvement in her sleep quality, although it remains suboptimal. She has been compliant with her CPAP therapy. She has schizoaffective disorder; hearing voices routinely.  She has not tolerated Zyprexa secondary to expressive aphasia, memory loss, and lack of concentration.  She has follow up scheduled with psychiatrist on 2025. MOCA 25 in office today.    She continues to struggle with constipation, which she describes as severe and unpredictable. She has initiated treatment with magnesium glycinate, taking her first dose today. She is contemplating the need for another colonoscopy, as her previous one was incomplete due to the length of her colon. She has previously tried Linzess without success.        Chief Complaint   Patient presents with    Follow-up     Patient Active Problem List   Diagnosis    Mixed conductive and sensorineural hearing loss of left ear with unrestricted hearing of right ear    Urge incontinence    Essential hypertension    Morbid obesity with BMI of 40.0-44.9, adult    Chronic bilateral low back pain without sciatica    JOVANI (obstructive sleep apnea)    Gastroesophageal reflux disease without esophagitis    Auditory hallucinations    Chronic sinusitis    Vitamin D deficiency    VIVIAN (stress urinary incontinence, female)    Weakness of pelvic floor    OAB (overactive bladder)    Prediabetes    Paranoid schizophrenia (HCC)    Chronic constipation        Reviewed and updated this visit by provider:  Tobacco  Allergies  Meds  Problems  Med Hx  Surg Hx  Fam Hx       Review of Systems - Negative except as stated in HPI    Visit Vitals  /84 (Site: Right Upper Arm, Position: Sitting)   Pulse 86   Temp 97 °F (36.1 °C) (Temporal)   Ht 1.575 m (5' 2\")   Wt 99.3 kg (219 lb)   SpO2 93%   BMI 40.06

## 2024-12-31 DIAGNOSIS — F25.9 SCHIZOAFFECTIVE DISORDER, UNSPECIFIED TYPE (HCC): ICD-10-CM

## 2024-12-31 DIAGNOSIS — E55.9 VITAMIN D DEFICIENCY: ICD-10-CM

## 2024-12-31 DIAGNOSIS — E21.0 PRIMARY HYPERPARATHYROIDISM (HCC): Primary | ICD-10-CM

## 2024-12-31 RX ORDER — ERGOCALCIFEROL 1.25 MG/1
50000 CAPSULE, LIQUID FILLED ORAL WEEKLY
Qty: 12 CAPSULE | Refills: 3 | Status: SHIPPED | OUTPATIENT
Start: 2024-12-31

## 2025-01-01 ASSESSMENT — PATIENT HEALTH QUESTIONNAIRE - PHQ9
8. MOVING OR SPEAKING SO SLOWLY THAT OTHER PEOPLE COULD HAVE NOTICED. OR THE OPPOSITE - BEING SO FIDGETY OR RESTLESS THAT YOU HAVE BEEN MOVING AROUND A LOT MORE THAN USUAL: NOT AT ALL
5. POOR APPETITE OR OVEREATING: MORE THAN HALF THE DAYS
SUM OF ALL RESPONSES TO PHQ9 QUESTIONS 1 & 2: 4
SUM OF ALL RESPONSES TO PHQ QUESTIONS 1-9: 9
10. IF YOU CHECKED OFF ANY PROBLEMS, HOW DIFFICULT HAVE THESE PROBLEMS MADE IT FOR YOU TO DO YOUR WORK, TAKE CARE OF THINGS AT HOME, OR GET ALONG WITH OTHER PEOPLE: NOT DIFFICULT AT ALL
9. THOUGHTS THAT YOU WOULD BE BETTER OFF DEAD, OR OF HURTING YOURSELF: NOT AT ALL
3. TROUBLE FALLING OR STAYING ASLEEP: NOT AT ALL
SUM OF ALL RESPONSES TO PHQ QUESTIONS 1-9: 9
6. FEELING BAD ABOUT YOURSELF - OR THAT YOU ARE A FAILURE OR HAVE LET YOURSELF OR YOUR FAMILY DOWN: NOT AT ALL
4. FEELING TIRED OR HAVING LITTLE ENERGY: NEARLY EVERY DAY
3. TROUBLE FALLING OR STAYING ASLEEP: NOT AT ALL
SUM OF ALL RESPONSES TO PHQ QUESTIONS 1-9: 9
2. FEELING DOWN, DEPRESSED OR HOPELESS: SEVERAL DAYS
SUM OF ALL RESPONSES TO PHQ QUESTIONS 1-9: 9
6. FEELING BAD ABOUT YOURSELF - OR THAT YOU ARE A FAILURE OR HAVE LET YOURSELF OR YOUR FAMILY DOWN: NOT AT ALL
7. TROUBLE CONCENTRATING ON THINGS, SUCH AS READING THE NEWSPAPER OR WATCHING TELEVISION: NOT AT ALL
5. POOR APPETITE OR OVEREATING: MORE THAN HALF THE DAYS
2. FEELING DOWN, DEPRESSED OR HOPELESS: SEVERAL DAYS
1. LITTLE INTEREST OR PLEASURE IN DOING THINGS: NEARLY EVERY DAY
7. TROUBLE CONCENTRATING ON THINGS, SUCH AS READING THE NEWSPAPER OR WATCHING TELEVISION: NOT AT ALL
10. IF YOU CHECKED OFF ANY PROBLEMS, HOW DIFFICULT HAVE THESE PROBLEMS MADE IT FOR YOU TO DO YOUR WORK, TAKE CARE OF THINGS AT HOME, OR GET ALONG WITH OTHER PEOPLE: NOT DIFFICULT AT ALL
8. MOVING OR SPEAKING SO SLOWLY THAT OTHER PEOPLE COULD HAVE NOTICED. OR THE OPPOSITE, BEING SO FIGETY OR RESTLESS THAT YOU HAVE BEEN MOVING AROUND A LOT MORE THAN USUAL: NOT AT ALL
1. LITTLE INTEREST OR PLEASURE IN DOING THINGS: NEARLY EVERY DAY
4. FEELING TIRED OR HAVING LITTLE ENERGY: NEARLY EVERY DAY
SUM OF ALL RESPONSES TO PHQ QUESTIONS 1-9: 9
9. THOUGHTS THAT YOU WOULD BE BETTER OFF DEAD, OR OF HURTING YOURSELF: NOT AT ALL

## 2025-01-02 ENCOUNTER — OFFICE VISIT (OUTPATIENT)
Dept: BEHAVIORAL/MENTAL HEALTH CLINIC | Age: 65
End: 2025-01-02
Payer: MEDICARE

## 2025-01-02 VITALS — DIASTOLIC BLOOD PRESSURE: 76 MMHG | HEART RATE: 69 BPM | OXYGEN SATURATION: 96 % | SYSTOLIC BLOOD PRESSURE: 114 MMHG

## 2025-01-02 DIAGNOSIS — F41.9 ANXIETY DISORDER, UNSPECIFIED TYPE: ICD-10-CM

## 2025-01-02 DIAGNOSIS — F20.0 PARANOID SCHIZOPHRENIA (HCC): Primary | ICD-10-CM

## 2025-01-02 LAB — VIT B1 BLD-SCNC: 120.1 NMOL/L (ref 66.5–200)

## 2025-01-02 PROCEDURE — 99215 OFFICE O/P EST HI 40 MIN: CPT | Performed by: STUDENT IN AN ORGANIZED HEALTH CARE EDUCATION/TRAINING PROGRAM

## 2025-01-02 PROCEDURE — 3078F DIAST BP <80 MM HG: CPT | Performed by: STUDENT IN AN ORGANIZED HEALTH CARE EDUCATION/TRAINING PROGRAM

## 2025-01-02 PROCEDURE — 3074F SYST BP LT 130 MM HG: CPT | Performed by: STUDENT IN AN ORGANIZED HEALTH CARE EDUCATION/TRAINING PROGRAM

## 2025-01-02 RX ORDER — LORAZEPAM 1 MG/1
1 TABLET ORAL 2 TIMES DAILY PRN
Qty: 60 TABLET | Refills: 0 | Status: SHIPPED | OUTPATIENT
Start: 2025-01-02 | End: 2025-02-01

## 2025-01-02 NOTE — PROGRESS NOTES
1.00 [place]   04/26/2024 03/28/2024 1 Lorazepam 1 Mg Tablet 30.00 30 Le Robert [id number] Pub (0321) 1/2 1.00 [place]         Vital Signs:     Temp Readings from Last 3 Encounters:   12/30/24 97 °F (36.1 °C) (Temporal)   07/31/24 97.2 °F (36.2 °C) (Temporal)   03/04/24 98.1 °F (36.7 °C)       BP Readings from Last 3 Encounters:   01/02/25 114/76   12/30/24 122/84   07/31/24 122/68       Pulse Readings from Last 3 Encounters:   01/02/25 69   12/30/24 86   07/31/24 68          Lab Results:     Lab Results   Component Value Date/Time    WBC 7.7 12/30/2024 01:03 PM    HGB 15.2 12/30/2024 01:03 PM    HCT 44.0 12/30/2024 01:03 PM    MCV 90.9 12/30/2024 01:03 PM    MCV 95.9 04/02/2020 10:27 AM    MCH 31.4 12/30/2024 01:03 PM    MCH 31.7 04/02/2020 10:27 AM    MCHC 34.5 12/30/2024 01:03 PM    MCHC 33.1 04/02/2020 10:27 AM    RDW 12.5 12/30/2024 01:03 PM    MPV 9.5 12/30/2024 01:03 PM    MONOPCT 6 12/30/2024 01:03 PM    EOSPCT 0 12/30/2024 01:03 PM    BASOPCT 0 12/30/2024 01:03 PM    DIFFTYPE AUTOMATED 12/30/2024 01:03 PM         Lab Results   Component Value Date    TRIG 221 (H) 07/31/2024    HDL 45 07/31/2024    LDL 85 07/31/2024    CHOL 174 07/31/2024    GLUCOSE 110 (H) 12/30/2024        All pertinent/available labs reviewed.    CT Head performed on 10/4/22 that was unremarkable.        Mental Status Examination    General/Appearance: Appears stated age, Well-kept, and Appropriately attired    Behavior: cooperative, engaged    Eye Contact: good    Psychomotor: Within normal limits    Musculoskeletal: gait wnl    Speech/Language: Within normal limits    Mood: \"not good\"    Affect: Appropriate, Congruent, and full range    Thought process: linear, goal directed, and coherent    Thought content: denies current SI/HI, A/VH, paranoia or delusions    Orientation: oriented to person, place, and time    Memory: Intact long-term and Intact short-term    Attention/Concentration: Sustained    Fund of knowledge:

## 2025-01-03 ENCOUNTER — TELEPHONE (OUTPATIENT)
Dept: BEHAVIORAL/MENTAL HEALTH CLINIC | Age: 65
End: 2025-01-03

## 2025-01-03 DIAGNOSIS — F20.0 PARANOID SCHIZOPHRENIA (HCC): Primary | ICD-10-CM

## 2025-01-03 RX ORDER — LURASIDONE HYDROCHLORIDE 20 MG/1
20 TABLET, FILM COATED ORAL
Qty: 30 TABLET | Refills: 2 | Status: SHIPPED | OUTPATIENT
Start: 2025-01-03

## 2025-01-03 NOTE — TELEPHONE ENCOUNTER
Nisa is going to be over 300.00 with insurance. She cannot afford that please advise next steps. Thanks

## 2025-01-16 PROBLEM — G89.29 CHRONIC BILATERAL LOW BACK PAIN WITHOUT SCIATICA: Status: RESOLVED | Noted: 2020-11-16 | Resolved: 2025-01-16

## 2025-01-16 PROBLEM — M54.50 CHRONIC BILATERAL LOW BACK PAIN WITHOUT SCIATICA: Status: RESOLVED | Noted: 2020-11-16 | Resolved: 2025-01-16

## 2025-01-17 DIAGNOSIS — N32.81 OAB (OVERACTIVE BLADDER): ICD-10-CM

## 2025-01-17 RX ORDER — NITROFURANTOIN 25; 75 MG/1; MG/1
100 CAPSULE ORAL 2 TIMES DAILY
COMMUNITY
Start: 2025-01-15 | End: 2025-01-20

## 2025-01-17 RX ORDER — AMLODIPINE BESYLATE 5 MG/1
5 TABLET ORAL DAILY
COMMUNITY
Start: 2025-01-15

## 2025-01-17 RX ORDER — PALIPERIDONE 6 MG/1
6 TABLET, EXTENDED RELEASE ORAL EVERY MORNING
COMMUNITY
Start: 2025-01-15

## 2025-01-17 RX ORDER — OXYBUTYNIN CHLORIDE 10 MG/1
10 TABLET, EXTENDED RELEASE ORAL DAILY
Qty: 90 TABLET | Refills: 1 | Status: SHIPPED | OUTPATIENT
Start: 2025-01-17

## 2025-01-17 RX ORDER — MIRTAZAPINE 15 MG/1
15 TABLET, FILM COATED ORAL NIGHTLY
COMMUNITY
Start: 2025-01-15

## 2025-01-17 RX ORDER — TIMOLOL MALEATE 5 MG/ML
SOLUTION/ DROPS OPHTHALMIC
COMMUNITY
Start: 2025-01-15

## 2025-01-17 RX ORDER — HYDROXYZINE PAMOATE 25 MG/1
25 CAPSULE ORAL 3 TIMES DAILY PRN
COMMUNITY
Start: 2025-01-15

## 2025-01-20 ENCOUNTER — OFFICE VISIT (OUTPATIENT)
Dept: ENDOCRINOLOGY | Age: 65
End: 2025-01-20
Payer: MEDICARE

## 2025-01-20 VITALS
DIASTOLIC BLOOD PRESSURE: 80 MMHG | WEIGHT: 217 LBS | HEIGHT: 62 IN | OXYGEN SATURATION: 98 % | HEART RATE: 89 BPM | BODY MASS INDEX: 39.93 KG/M2 | SYSTOLIC BLOOD PRESSURE: 122 MMHG

## 2025-01-20 DIAGNOSIS — E55.9 VITAMIN D DEFICIENCY: ICD-10-CM

## 2025-01-20 DIAGNOSIS — Z78.0 POSTMENOPAUSAL: ICD-10-CM

## 2025-01-20 DIAGNOSIS — E21.0 PRIMARY HYPERPARATHYROIDISM (HCC): Primary | ICD-10-CM

## 2025-01-20 DIAGNOSIS — E21.0 PRIMARY HYPERPARATHYROIDISM (HCC): ICD-10-CM

## 2025-01-20 LAB
25(OH)D3 SERPL-MCNC: 17.1 NG/ML (ref 30–100)
ALBUMIN SERPL-MCNC: 4.3 G/DL (ref 3.2–4.6)
ALBUMIN/GLOB SERPL: 1.5 (ref 1–1.9)
ALP SERPL-CCNC: 161 U/L (ref 35–104)
ALT SERPL-CCNC: 28 U/L (ref 8–45)
ANION GAP SERPL CALC-SCNC: 15 MMOL/L (ref 7–16)
AST SERPL-CCNC: 25 U/L (ref 15–37)
BILIRUB SERPL-MCNC: 0.4 MG/DL (ref 0–1.2)
BUN SERPL-MCNC: 10 MG/DL (ref 8–23)
CA-I BLD-MCNC: 5.22 MG/DL (ref 4–5.2)
CALCIUM SERPL-MCNC: 10 MG/DL (ref 8.8–10.2)
CALCIUM SERPL-MCNC: 10 MG/DL (ref 8.8–10.2)
CHLORIDE SERPL-SCNC: 103 MMOL/L (ref 98–107)
CO2 SERPL-SCNC: 22 MMOL/L (ref 20–29)
CREAT SERPL-MCNC: 0.91 MG/DL (ref 0.6–1.1)
GLOBULIN SER CALC-MCNC: 2.8 G/DL (ref 2.3–3.5)
GLUCOSE SERPL-MCNC: 121 MG/DL (ref 70–99)
PHOSPHATE SERPL-MCNC: 2.6 MG/DL (ref 2.5–4.5)
POTASSIUM SERPL-SCNC: 4.5 MMOL/L (ref 3.5–5.1)
PROT SERPL-MCNC: 7.1 G/DL (ref 6.3–8.2)
PTH-INTACT SERPL-MCNC: 88.1 PG/ML (ref 15–65)
SODIUM SERPL-SCNC: 139 MMOL/L (ref 136–145)

## 2025-01-20 PROCEDURE — G2211 COMPLEX E/M VISIT ADD ON: HCPCS | Performed by: INTERNAL MEDICINE

## 2025-01-20 PROCEDURE — 3079F DIAST BP 80-89 MM HG: CPT | Performed by: INTERNAL MEDICINE

## 2025-01-20 PROCEDURE — 99204 OFFICE O/P NEW MOD 45 MIN: CPT | Performed by: INTERNAL MEDICINE

## 2025-01-20 PROCEDURE — 3074F SYST BP LT 130 MM HG: CPT | Performed by: INTERNAL MEDICINE

## 2025-01-20 ASSESSMENT — ENCOUNTER SYMPTOMS
CONSTIPATION: 1
SHORTNESS OF BREATH: 1
DIARRHEA: 1

## 2025-01-20 NOTE — PROGRESS NOTES
Encounter   Procedures    DEXA BONE DENSITY AXIAL SKELETON     Standing Status:   Future     Standing Expiration Date:   1/20/2026     Order Specific Question:   Reason for exam:     Answer:   Primary hyperparathyroidism.  Please assess hips, spine, and distal radius and nondominant forearm.    Comprehensive Metabolic Panel     Standing Status:   Future     Number of Occurrences:   1     Standing Expiration Date:   1/20/2026    Vitamin D 25 Hydroxy     Standing Status:   Future     Number of Occurrences:   1     Standing Expiration Date:   1/20/2026    Phosphorus     Standing Status:   Future     Number of Occurrences:   1     Standing Expiration Date:   1/20/2026    PTH, Intact     Standing Status:   Future     Number of Occurrences:   1     Standing Expiration Date:   1/20/2026    Calcium, Ionized     Standing Status:   Future     Number of Occurrences:   1     Standing Expiration Date:   1/20/2026    Comprehensive Metabolic Panel     Standing Status:   Future     Standing Expiration Date:   1/20/2026    Vitamin D 25 Hydroxy     Standing Status:   Future     Standing Expiration Date:   1/20/2026    Phosphorus     Standing Status:   Future     Standing Expiration Date:   1/20/2026       Current Outpatient Medications   Medication Sig Dispense Refill    mirtazapine (REMERON) 15 MG tablet Take 1 tablet by mouth nightly      paliperidone (INVEGA) 6 MG extended release tablet Take 1 tablet by mouth every morning      timolol (TIMOPTIC) 0.5 % ophthalmic solution       nitrofurantoin, macrocrystal-monohydrate, (MACROBID) 100 MG capsule Take 1 capsule by mouth 2 times daily      hydrOXYzine pamoate (VISTARIL) 25 MG capsule Take 1 capsule by mouth 3 times daily as needed      amLODIPine (NORVASC) 5 MG tablet Take 1 tablet by mouth daily      oxyBUTYnin (DITROPAN XL) 10 MG extended release tablet Take 1 tablet by mouth daily 90 tablet 1    vitamin D (ERGOCALCIFEROL) 1.25 MG (84303 UT) CAPS capsule Take 1 capsule by mouth

## 2025-01-27 ENCOUNTER — OFFICE VISIT (OUTPATIENT)
Dept: INTERNAL MEDICINE CLINIC | Facility: CLINIC | Age: 65
End: 2025-01-27
Payer: MEDICARE

## 2025-01-27 VITALS
DIASTOLIC BLOOD PRESSURE: 82 MMHG | TEMPERATURE: 97.2 F | OXYGEN SATURATION: 98 % | HEIGHT: 62 IN | SYSTOLIC BLOOD PRESSURE: 138 MMHG | HEART RATE: 83 BPM | BODY MASS INDEX: 39.56 KG/M2 | WEIGHT: 215 LBS

## 2025-01-27 DIAGNOSIS — F22 DELUSIONAL DISORDER (HCC): Primary | Chronic | ICD-10-CM

## 2025-01-27 DIAGNOSIS — G47.33 OSA ON CPAP: ICD-10-CM

## 2025-01-27 DIAGNOSIS — E21.0 PRIMARY HYPERPARATHYROIDISM (HCC): ICD-10-CM

## 2025-01-27 DIAGNOSIS — I10 ESSENTIAL HYPERTENSION: Chronic | ICD-10-CM

## 2025-01-27 DIAGNOSIS — E55.9 VITAMIN D DEFICIENCY: ICD-10-CM

## 2025-01-27 DIAGNOSIS — R73.03 PREDIABETES: ICD-10-CM

## 2025-01-27 DIAGNOSIS — N32.81 OAB (OVERACTIVE BLADDER): ICD-10-CM

## 2025-01-27 DIAGNOSIS — F51.01 PRIMARY INSOMNIA: Chronic | ICD-10-CM

## 2025-01-27 PROCEDURE — 1123F ACP DISCUSS/DSCN MKR DOCD: CPT | Performed by: NURSE PRACTITIONER

## 2025-01-27 PROCEDURE — 3079F DIAST BP 80-89 MM HG: CPT | Performed by: NURSE PRACTITIONER

## 2025-01-27 PROCEDURE — 3075F SYST BP GE 130 - 139MM HG: CPT | Performed by: NURSE PRACTITIONER

## 2025-01-27 PROCEDURE — 99214 OFFICE O/P EST MOD 30 MIN: CPT | Performed by: NURSE PRACTITIONER

## 2025-01-27 RX ORDER — PALIPERIDONE 6 MG/1
6 TABLET, EXTENDED RELEASE ORAL EVERY MORNING
Qty: 30 TABLET | Refills: 5 | Status: SHIPPED | OUTPATIENT
Start: 2025-01-27

## 2025-01-27 RX ORDER — MIRTAZAPINE 30 MG/1
30 TABLET, FILM COATED ORAL NIGHTLY
Qty: 30 TABLET | Refills: 5 | Status: SHIPPED | OUTPATIENT
Start: 2025-01-27

## 2025-01-27 SDOH — ECONOMIC STABILITY: FOOD INSECURITY: WITHIN THE PAST 12 MONTHS, THE FOOD YOU BOUGHT JUST DIDN'T LAST AND YOU DIDN'T HAVE MONEY TO GET MORE.: NEVER TRUE

## 2025-01-27 SDOH — ECONOMIC STABILITY: FOOD INSECURITY: WITHIN THE PAST 12 MONTHS, YOU WORRIED THAT YOUR FOOD WOULD RUN OUT BEFORE YOU GOT MONEY TO BUY MORE.: NEVER TRUE

## 2025-01-27 NOTE — ASSESSMENT & PLAN NOTE
Recently diagnosed with delusional disorder by Dr. Lee at [facility]; previous diagnosis paranoid schizophrenia. Unfortunately, she has discontinued palperidone after being discharged from [facility] on 1/15/2025 as there was some confusion when it was not ready at the pharmacy.  Additionally, she states she has not been hearing her neighbor so feels as if she does not need the medication.  Long discussion regarding long-term medical compliance with all disease processes; waxing/waning of symptoms and subsequently with medications can disrupt management and cause negative symptoms and side effects. Most likely, she is feeling fair since palpieridone is long acting and she may have some medication still in her system.   After discussion, she is agreeable to restart this medication.  She is agreeable to follow up with psychiatrist in the near future; will change to a psychiatrist who is demographically closer to her in [place] for convenience.    Orders:    paliperidone (INVEGA) 6 MG extended release tablet; Take 1 tablet by mouth every morning

## 2025-01-27 NOTE — ASSESSMENT & PLAN NOTE
Increase mirtazapine to 30mg Qhs.  Orders:    mirtazapine (REMERON) 30 MG tablet; Take 1 tablet by mouth nightly

## 2025-01-27 NOTE — ASSESSMENT & PLAN NOTE
Monitored by specialist- no acute findings meriting change in the plan  Continue Vitamin D weekly.  DEXA scheduled.
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
## 2025-02-15 RX ORDER — LUBIPROSTONE 8 UG/1
8 CAPSULE ORAL 2 TIMES DAILY WITH MEALS
COMMUNITY
Start: 2025-01-28

## 2025-02-25 ENCOUNTER — OFFICE VISIT (OUTPATIENT)
Dept: BEHAVIORAL/MENTAL HEALTH CLINIC | Facility: CLINIC | Age: 65
End: 2025-02-25
Payer: MEDICARE

## 2025-02-25 VITALS — BODY MASS INDEX: 37.28 KG/M2 | HEIGHT: 62 IN | WEIGHT: 202.6 LBS

## 2025-02-25 DIAGNOSIS — F41.9 ANXIETY DISORDER, UNSPECIFIED TYPE: ICD-10-CM

## 2025-02-25 DIAGNOSIS — F43.23 ADJUSTMENT DISORDER WITH MIXED ANXIETY AND DEPRESSED MOOD: ICD-10-CM

## 2025-02-25 DIAGNOSIS — G47.00 INSOMNIA, UNSPECIFIED TYPE: ICD-10-CM

## 2025-02-25 DIAGNOSIS — F22 DELUSIONAL DISORDER (HCC): Primary | ICD-10-CM

## 2025-02-25 PROCEDURE — 90792 PSYCH DIAG EVAL W/MED SRVCS: CPT | Performed by: PSYCHIATRY & NEUROLOGY

## 2025-02-25 RX ORDER — QUETIAPINE FUMARATE 25 MG/1
25-50 TABLET, FILM COATED ORAL
Qty: 60 TABLET | Refills: 1 | Status: SHIPPED | OUTPATIENT
Start: 2025-02-25
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
## 2025-03-25 ENCOUNTER — OFFICE VISIT (OUTPATIENT)
Dept: BEHAVIORAL/MENTAL HEALTH CLINIC | Facility: CLINIC | Age: 65
End: 2025-03-25
Payer: MEDICARE

## 2025-03-25 VITALS
SYSTOLIC BLOOD PRESSURE: 152 MMHG | BODY MASS INDEX: 35.85 KG/M2 | OXYGEN SATURATION: 100 % | WEIGHT: 194.8 LBS | HEART RATE: 71 BPM | HEIGHT: 62 IN | DIASTOLIC BLOOD PRESSURE: 96 MMHG

## 2025-03-25 DIAGNOSIS — F41.9 ANXIETY DISORDER, UNSPECIFIED TYPE: ICD-10-CM

## 2025-03-25 DIAGNOSIS — G47.00 INSOMNIA, UNSPECIFIED TYPE: ICD-10-CM

## 2025-03-25 DIAGNOSIS — F43.23 ADJUSTMENT DISORDER WITH MIXED ANXIETY AND DEPRESSED MOOD: ICD-10-CM

## 2025-03-25 DIAGNOSIS — F22 DELUSIONAL DISORDER (HCC): ICD-10-CM

## 2025-03-25 PROCEDURE — 1123F ACP DISCUSS/DSCN MKR DOCD: CPT | Performed by: PSYCHIATRY & NEUROLOGY

## 2025-03-25 PROCEDURE — 99214 OFFICE O/P EST MOD 30 MIN: CPT | Performed by: PSYCHIATRY & NEUROLOGY

## 2025-03-25 PROCEDURE — 3077F SYST BP >= 140 MM HG: CPT | Performed by: PSYCHIATRY & NEUROLOGY

## 2025-03-25 PROCEDURE — 3080F DIAST BP >= 90 MM HG: CPT | Performed by: PSYCHIATRY & NEUROLOGY

## 2025-03-25 RX ORDER — QUETIAPINE FUMARATE 25 MG/1
25-37.5 TABLET, FILM COATED ORAL
Qty: 45 TABLET | Refills: 1 | Status: SHIPPED | OUTPATIENT
Start: 2025-03-25

## 2025-03-25 ASSESSMENT — PATIENT HEALTH QUESTIONNAIRE - PHQ9
SUM OF ALL RESPONSES TO PHQ QUESTIONS 1-9: 0
SUM OF ALL RESPONSES TO PHQ QUESTIONS 1-9: 0
1. LITTLE INTEREST OR PLEASURE IN DOING THINGS: NOT AT ALL
SUM OF ALL RESPONSES TO PHQ QUESTIONS 1-9: 0
SUM OF ALL RESPONSES TO PHQ QUESTIONS 1-9: 0
2. FEELING DOWN, DEPRESSED OR HOPELESS: NOT AT ALL
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
## 2025-03-26 DIAGNOSIS — I10 ESSENTIAL HYPERTENSION: Primary | ICD-10-CM

## 2025-03-26 RX ORDER — AMLODIPINE BESYLATE 5 MG/1
5 TABLET ORAL DAILY
Qty: 90 TABLET | Refills: 3 | Status: SHIPPED | OUTPATIENT
Start: 2025-03-26

## 2025-04-10 ENCOUNTER — OFFICE VISIT (OUTPATIENT)
Dept: INTERNAL MEDICINE CLINIC | Facility: CLINIC | Age: 65
End: 2025-04-10
Payer: MEDICARE

## 2025-04-10 VITALS
SYSTOLIC BLOOD PRESSURE: 130 MMHG | BODY MASS INDEX: 35.37 KG/M2 | TEMPERATURE: 97.3 F | WEIGHT: 192.2 LBS | HEIGHT: 62 IN | DIASTOLIC BLOOD PRESSURE: 60 MMHG | OXYGEN SATURATION: 98 % | HEART RATE: 74 BPM

## 2025-04-10 DIAGNOSIS — G47.33 OSA ON CPAP: ICD-10-CM

## 2025-04-10 DIAGNOSIS — F41.9 ANXIETY: ICD-10-CM

## 2025-04-10 DIAGNOSIS — F22 DELUSIONAL DISORDER (HCC): ICD-10-CM

## 2025-04-10 DIAGNOSIS — R00.2 PALPITATIONS: Primary | ICD-10-CM

## 2025-04-10 PROBLEM — E66.09 CLASS 1 OBESITY DUE TO EXCESS CALORIES WITH SERIOUS COMORBIDITY AND BODY MASS INDEX (BMI) OF 34.0 TO 34.9 IN ADULT: Status: ACTIVE | Noted: 2020-11-16

## 2025-04-10 PROBLEM — E66.811 CLASS 1 OBESITY DUE TO EXCESS CALORIES WITH SERIOUS COMORBIDITY AND BODY MASS INDEX (BMI) OF 34.0 TO 34.9 IN ADULT: Status: ACTIVE | Noted: 2020-11-16

## 2025-04-10 PROCEDURE — 99215 OFFICE O/P EST HI 40 MIN: CPT | Performed by: NURSE PRACTITIONER

## 2025-04-10 PROCEDURE — 1123F ACP DISCUSS/DSCN MKR DOCD: CPT | Performed by: NURSE PRACTITIONER

## 2025-04-10 PROCEDURE — 3075F SYST BP GE 130 - 139MM HG: CPT | Performed by: NURSE PRACTITIONER

## 2025-04-10 PROCEDURE — 93000 ELECTROCARDIOGRAM COMPLETE: CPT | Performed by: NURSE PRACTITIONER

## 2025-04-10 PROCEDURE — 3078F DIAST BP <80 MM HG: CPT | Performed by: NURSE PRACTITIONER

## 2025-04-10 RX ORDER — PALIPERIDONE 3 MG/1
3 TABLET, EXTENDED RELEASE ORAL EVERY MORNING
Qty: 30 TABLET | Refills: 0 | Status: SHIPPED | OUTPATIENT
Start: 2025-04-10

## 2025-04-10 RX ORDER — LATANOPROST 50 UG/ML
1 SOLUTION/ DROPS OPHTHALMIC DAILY
COMMUNITY

## 2025-04-10 RX ORDER — LORAZEPAM 1 MG/1
1 TABLET ORAL EVERY 8 HOURS PRN
COMMUNITY

## 2025-04-10 NOTE — PROGRESS NOTES
processes of \"hearing through walls\" and orient her back to the delusions vs stating \"I am not crazy; this is real\". Confirmed that what she hears is very real. She is agreeable to restart Invega slowly  - directions for use and side effects discussed.  Will continue Seroquel 25mg Qhs.  Great social support; spoke to spouse via telephone with her permission for 20 minutes.  Compliant and benefiting from nightly CPAP.    Follow up as scheduled with me on 4/29/2025 and psychiatry on 5/7/2025.    On this date 4/10/2025 I have spent 60 minutes reviewing previous notes, test results and face to face with the patient discussing the diagnosis and importance of compliance with the treatment plan as well as documenting on the day of the visit.    Julia S Paduano, ADONAY - CNP

## 2025-04-14 NOTE — ASSESSMENT & PLAN NOTE
She is s/p Vibegron and oxybutynin. We discussed the differential diagnosis of overactive bladder. We discussed the epidemiology, pathogenesis, and etiology of bladder overactivity including the neurophysiologic axis. I offered options which include nothing, medications, physical therapy, behavior modification, and neuromodulation. She has tried medications and physical therapy without any improvement. After discussing her options for third line therapy, she is interested in botox/ chemodenervation to the pelvic floor. We discussed the benefits of botox. Many women have at least 50% reduction inn daily leaking episodes and that it may have to be injected every 4-12 months. We discussed the risks of botox which include UTI, urinary retention and a reaction to the onabotulinumtoxinA (Botox). She also   States that she is willing to self-cath if she does develop urinary retention. We also discussed SNM. She is going to try the coupon for vibegron and read about botox and SNM. If she is interested in wither the botox or SNM, she will need to be set up for cystoscopy and UDS.
She is s/p Vibegron and oxybutynin. We discussed the differential diagnosis of overactive bladder. We discussed the epidemiology, pathogenesis, and etiology of bladder overactivity including the neurophysiologic axis. I offered options which include nothing, medications, physical therapy, behavior modification, and neuromodulation. She has tried medications and physical therapy without any improvement. After discussing her options for third line therapy, she is interested in botox/ chemodenervation to the pelvic floor. We discussed the benefits of botox. Many women have at least 50% reduction inn daily leaking episodes and that it may have to be injected every 4-12 months. We discussed the risks of botox which include UTI, urinary retention and a reaction to the onabotulinumtoxinA (Botox). She also   States that she is willing to self-cath if she does develop urinary retention. We also discussed SNM. She is going to try the coupon for vibegron and read about botox and SNM. If she is interested in wither the botox or SNM, she will need to be set up for cystoscopy and UDS.
Detail Level: Zone
Render In Strict Bullet Format?: No
Continue Regimen: Isotretinoin 40 mg
Initiate Treatment: hydrocortisone 2.5 % topical ointment

## 2025-04-15 DIAGNOSIS — R41.3 MEMORY LOSS: ICD-10-CM

## 2025-04-15 DIAGNOSIS — R73.9 HYPERGLYCEMIA: ICD-10-CM

## 2025-04-15 DIAGNOSIS — F22 DELUSIONAL DISORDER (HCC): Primary | ICD-10-CM

## 2025-04-16 ENCOUNTER — CARE COORDINATION (OUTPATIENT)
Dept: CARE COORDINATION | Facility: CLINIC | Age: 65
End: 2025-04-16

## 2025-04-16 NOTE — CARE COORDINATION
JACKIE MOSER received referral from provider regarding assistance/advice to family for pt.  JACKIE MOSER reached out to pt daughter this to day to introduce self and role.  Unable to reach at this time.  VM left with return call details.  Will await return call and/or try again at a later time.

## 2025-04-17 ENCOUNTER — CARE COORDINATION (OUTPATIENT)
Dept: CARE COORDINATION | Facility: CLINIC | Age: 65
End: 2025-04-17

## 2025-04-17 NOTE — CARE COORDINATION
JACKIE MOSER continues to follow for SW Program.  Reached out this day to pt daughter as requested.  Unable to reach at this time.  VM left with return call details.  Will await return call and/or try again at a later time.

## 2025-04-25 ENCOUNTER — CARE COORDINATION (OUTPATIENT)
Dept: CARE COORDINATION | Facility: CLINIC | Age: 65
End: 2025-04-25

## 2025-04-25 NOTE — CARE COORDINATION
JACKIE MOSER continues to follow for  Program.  JACKIE MOSER reached out and spoke with pt's daughter this day.  She was interested in the discussing the differences between HCPOA and possible guardianship for pt.  Ultimately, she decided she would like to begin HCPOA process.  JACKIE MOSER emailed her paperwork this day.  Will continue to monitor and update as needed.

## 2025-04-29 ENCOUNTER — OFFICE VISIT (OUTPATIENT)
Dept: INTERNAL MEDICINE CLINIC | Facility: CLINIC | Age: 65
End: 2025-04-29
Payer: MEDICARE

## 2025-04-29 VITALS
BODY MASS INDEX: 34.23 KG/M2 | TEMPERATURE: 97.2 F | DIASTOLIC BLOOD PRESSURE: 80 MMHG | SYSTOLIC BLOOD PRESSURE: 132 MMHG | WEIGHT: 186 LBS | OXYGEN SATURATION: 98 % | HEIGHT: 62 IN | HEART RATE: 86 BPM

## 2025-04-29 DIAGNOSIS — F22 DELUSIONAL DISORDER (HCC): ICD-10-CM

## 2025-04-29 DIAGNOSIS — I10 ESSENTIAL HYPERTENSION: ICD-10-CM

## 2025-04-29 DIAGNOSIS — E21.0 PRIMARY HYPERPARATHYROIDISM: ICD-10-CM

## 2025-04-29 DIAGNOSIS — R73.03 PREDIABETES: ICD-10-CM

## 2025-04-29 DIAGNOSIS — Z00.00 WELCOME TO MEDICARE PREVENTIVE VISIT: ICD-10-CM

## 2025-04-29 DIAGNOSIS — E55.9 VITAMIN D DEFICIENCY: ICD-10-CM

## 2025-04-29 DIAGNOSIS — N39.41 URGE INCONTINENCE: ICD-10-CM

## 2025-04-29 DIAGNOSIS — N32.81 OAB (OVERACTIVE BLADDER): ICD-10-CM

## 2025-04-29 DIAGNOSIS — G47.33 OSA ON CPAP: ICD-10-CM

## 2025-04-29 DIAGNOSIS — R41.3 MEMORY LOSS: ICD-10-CM

## 2025-04-29 DIAGNOSIS — J02.0 STREP PHARYNGITIS: Primary | ICD-10-CM

## 2025-04-29 LAB
BASOPHILS # BLD: 0.02 K/UL (ref 0–0.2)
BASOPHILS NFR BLD: 0.4 % (ref 0–2)
BILIRUBIN, URINE, POC: NEGATIVE
BLOOD URINE, POC: NORMAL
DIFFERENTIAL METHOD BLD: ABNORMAL
EOSINOPHIL # BLD: 0 K/UL (ref 0–0.8)
EOSINOPHIL NFR BLD: 0 % (ref 0.5–7.8)
ERYTHROCYTE [DISTWIDTH] IN BLOOD BY AUTOMATED COUNT: 13.8 % (ref 11.9–14.6)
EST. AVERAGE GLUCOSE BLD GHB EST-MCNC: 106 MG/DL
FOLATE SERPL-MCNC: 3.1 NG/ML (ref 3.1–17.5)
GLUCOSE URINE, POC: NEGATIVE
HBA1C MFR BLD: 5.3 % (ref 0–5.6)
HCT VFR BLD AUTO: 43.1 % (ref 35.8–46.3)
HGB BLD-MCNC: 15 G/DL (ref 11.7–15.4)
HIV 1+2 AB+HIV1 P24 AG SERPL QL IA: NONREACTIVE
HIV 1/2 RESULT COMMENT: NORMAL
IMM GRANULOCYTES # BLD AUTO: 0.02 K/UL (ref 0–0.5)
IMM GRANULOCYTES NFR BLD AUTO: 0.4 % (ref 0–5)
KETONES, URINE, POC: NEGATIVE
LEUKOCYTE ESTERASE, URINE, POC: NORMAL
LYMPHOCYTES # BLD: 0.81 K/UL (ref 0.5–4.6)
LYMPHOCYTES NFR BLD: 15.5 % (ref 13–44)
MCH RBC QN AUTO: 31.2 PG (ref 26.1–32.9)
MCHC RBC AUTO-ENTMCNC: 34.8 G/DL (ref 31.4–35)
MCV RBC AUTO: 89.6 FL (ref 82–102)
MONOCYTES # BLD: 0.69 K/UL (ref 0.1–1.3)
MONOCYTES NFR BLD: 13.2 % (ref 4–12)
NEUTS SEG # BLD: 3.7 K/UL (ref 1.7–8.2)
NEUTS SEG NFR BLD: 70.5 % (ref 43–78)
NITRITE, URINE, POC: NEGATIVE
NRBC # BLD: 0 K/UL (ref 0–0.2)
PH, URINE, POC: 5.5 (ref 4.6–8)
PLATELET # BLD AUTO: 222 K/UL (ref 150–450)
PMV BLD AUTO: 9.9 FL (ref 9.4–12.3)
PROTEIN,URINE, POC: NEGATIVE
RBC # BLD AUTO: 4.81 M/UL (ref 4.05–5.2)
SPECIFIC GRAVITY, URINE, POC: 1 (ref 1–1.03)
T PALLIDUM AB SER QL IA: NONREACTIVE
TSH W FREE THYROID IF ABNORMAL: 1.62 UIU/ML (ref 0.27–4.2)
URINALYSIS CLARITY, POC: NORMAL
URINALYSIS COLOR, POC: YELLOW
UROBILINOGEN, POC: NORMAL
VIT B12 SERPL-MCNC: 285 PG/ML (ref 193–986)
WBC # BLD AUTO: 5.2 K/UL (ref 4.3–11.1)

## 2025-04-29 PROCEDURE — 3079F DIAST BP 80-89 MM HG: CPT | Performed by: NURSE PRACTITIONER

## 2025-04-29 PROCEDURE — 1123F ACP DISCUSS/DSCN MKR DOCD: CPT | Performed by: NURSE PRACTITIONER

## 2025-04-29 PROCEDURE — 3075F SYST BP GE 130 - 139MM HG: CPT | Performed by: NURSE PRACTITIONER

## 2025-04-29 PROCEDURE — G0402 INITIAL PREVENTIVE EXAM: HCPCS | Performed by: NURSE PRACTITIONER

## 2025-04-29 PROCEDURE — 81003 URINALYSIS AUTO W/O SCOPE: CPT | Performed by: NURSE PRACTITIONER

## 2025-04-29 RX ORDER — PALIPERIDONE 6 MG/1
6 TABLET, EXTENDED RELEASE ORAL EVERY MORNING
Qty: 30 TABLET | Refills: 0 | Status: SHIPPED | OUTPATIENT
Start: 2025-04-29

## 2025-04-29 RX ORDER — AMOXICILLIN 875 MG/1
875 TABLET, COATED ORAL 2 TIMES DAILY
Qty: 20 TABLET | Refills: 0 | Status: SHIPPED | OUTPATIENT
Start: 2025-04-29 | End: 2025-05-09

## 2025-04-29 ASSESSMENT — PATIENT HEALTH QUESTIONNAIRE - PHQ9
2. FEELING DOWN, DEPRESSED OR HOPELESS: NOT AT ALL
1. LITTLE INTEREST OR PLEASURE IN DOING THINGS: NOT AT ALL
SUM OF ALL RESPONSES TO PHQ QUESTIONS 1-9: 0
SUM OF ALL RESPONSES TO PHQ QUESTIONS 1-9: 0

## 2025-04-29 ASSESSMENT — LIFESTYLE VARIABLES
HOW MANY STANDARD DRINKS CONTAINING ALCOHOL DO YOU HAVE ON A TYPICAL DAY: PATIENT DOES NOT DRINK
HOW OFTEN DO YOU HAVE A DRINK CONTAINING ALCOHOL: NEVER
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
## 2025-04-29 NOTE — PATIENT INSTRUCTIONS
cholesterol levels.     Limit the amount of solid fat--butter, margarine, and shortening--you eat. Use olive, peanut, or canola oil when you cook. Bake, broil, and steam foods instead of frying them.     Eat a variety of fruit and vegetables every day. Dark green, deep orange, red, or yellow fruits and vegetables are especially good for you. Examples include spinach, carrots, peaches, and berries.     Foods high in fiber can reduce your cholesterol and provide important vitamins and minerals. High-fiber foods include whole-grain cereals and breads, oatmeal, beans, brown rice, citrus fruits, and apples.     Eat lean proteins. Heart-healthy proteins include seafood, lean meats and poultry, eggs, beans, peas, nuts, seeds, and soy products.     Limit drinks and foods with added sugar. These include candy, desserts, and soda pop.   Heart-healthy lifestyle    If your doctor recommends it, get more exercise. For many people, walking is a good choice. Or you may want to swim, bike, or do other activities. Bit by bit, increase the time you're active every day. Try for at least 30 minutes on most days of the week.     Try to quit or cut back on using tobacco and other nicotine products. This includes smoking and vaping. If you need help quitting, talk to your doctor about stop-smoking programs and medicines. These can increase your chances of quitting for good. Quitting is one of the most important things you can do to protect your heart. It is never too late to quit. Try to avoid secondhand smoke too.     Stay at a weight that's healthy for you. Talk to your doctor if you need help losing weight.     Try to get 7 to 9 hours of sleep each night.     Limit alcohol to 2 drinks a day for men and 1 drink a day for women. Too much alcohol can cause health problems.     Manage other health problems such as diabetes, high blood pressure, and high cholesterol. If you think you may have a problem with alcohol or drug use, talk to your

## 2025-04-30 ENCOUNTER — RESULTS FOLLOW-UP (OUTPATIENT)
Dept: INTERNAL MEDICINE CLINIC | Facility: CLINIC | Age: 65
End: 2025-04-30

## 2025-05-01 LAB
BACTERIA SPEC CULT: NORMAL
SERVICE CMNT-IMP: NORMAL

## 2025-05-02 ENCOUNTER — CARE COORDINATION (OUTPATIENT)
Dept: CARE COORDINATION | Facility: CLINIC | Age: 65
End: 2025-05-02

## 2025-05-02 NOTE — CARE COORDINATION
JACKIE MOSER continues to follow for  Program.  JACKIE MOSER received email from pt daughter regarding home care visits and what is covered.  JACKIE MOSER provided detail explanation regarding coverage of home care visits.  Explained options of insurance coverage verse application of Medicaid.  Will await return of email and/or follow up at a later time.

## 2025-05-06 ENCOUNTER — OFFICE VISIT (OUTPATIENT)
Dept: BEHAVIORAL/MENTAL HEALTH CLINIC | Facility: CLINIC | Age: 65
End: 2025-05-06
Payer: MEDICARE

## 2025-05-06 VITALS
OXYGEN SATURATION: 97 % | WEIGHT: 182.8 LBS | SYSTOLIC BLOOD PRESSURE: 142 MMHG | DIASTOLIC BLOOD PRESSURE: 78 MMHG | HEIGHT: 62 IN | HEART RATE: 92 BPM | BODY MASS INDEX: 33.64 KG/M2

## 2025-05-06 DIAGNOSIS — F43.23 ADJUSTMENT DISORDER WITH MIXED ANXIETY AND DEPRESSED MOOD: ICD-10-CM

## 2025-05-06 DIAGNOSIS — F22 DELUSIONAL DISORDER (HCC): Primary | ICD-10-CM

## 2025-05-06 DIAGNOSIS — G47.00 INSOMNIA, UNSPECIFIED TYPE: ICD-10-CM

## 2025-05-06 DIAGNOSIS — F41.9 ANXIETY DISORDER, UNSPECIFIED TYPE: ICD-10-CM

## 2025-05-06 PROCEDURE — 3078F DIAST BP <80 MM HG: CPT | Performed by: PSYCHIATRY & NEUROLOGY

## 2025-05-06 PROCEDURE — 1123F ACP DISCUSS/DSCN MKR DOCD: CPT | Performed by: PSYCHIATRY & NEUROLOGY

## 2025-05-06 PROCEDURE — 3077F SYST BP >= 140 MM HG: CPT | Performed by: PSYCHIATRY & NEUROLOGY

## 2025-05-06 PROCEDURE — 99214 OFFICE O/P EST MOD 30 MIN: CPT | Performed by: PSYCHIATRY & NEUROLOGY

## 2025-05-06 RX ORDER — QUETIAPINE FUMARATE 50 MG/1
50-100 TABLET, FILM COATED ORAL
Qty: 30 TABLET | Refills: 1 | Status: SHIPPED | OUTPATIENT
Start: 2025-05-06

## 2025-05-06 RX ORDER — PALIPERIDONE 9 MG/1
9 TABLET, EXTENDED RELEASE ORAL
Qty: 30 TABLET | Refills: 1 | Status: SHIPPED | OUTPATIENT
Start: 2025-05-06

## 2025-05-06 ASSESSMENT — PATIENT HEALTH QUESTIONNAIRE - PHQ9
SUM OF ALL RESPONSES TO PHQ QUESTIONS 1-9: 0
SUM OF ALL RESPONSES TO PHQ QUESTIONS 1-9: 0
2. FEELING DOWN, DEPRESSED OR HOPELESS: NOT AT ALL
SUM OF ALL RESPONSES TO PHQ QUESTIONS 1-9: 0
SUM OF ALL RESPONSES TO PHQ QUESTIONS 1-9: 0
1. LITTLE INTEREST OR PLEASURE IN DOING THINGS: NOT AT ALL
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
## 2025-05-07 DIAGNOSIS — R41.3 MEMORY LOSS: Primary | ICD-10-CM

## 2025-05-07 DIAGNOSIS — F22 DELUSIONAL DISORDER (HCC): ICD-10-CM

## 2025-05-08 ENCOUNTER — CARE COORDINATION (OUTPATIENT)
Dept: CARE COORDINATION | Facility: CLINIC | Age: 65
End: 2025-05-08

## 2025-05-08 NOTE — CARE COORDINATION
JACKIE CM continues to follow for SW Program.  Resource information has been provided to pt's daughter as well as additional information regarding home care services/options.  JACKIE CM remains available to assist as needed.  Will continue to monitor and update.

## 2025-05-13 ENCOUNTER — HOSPITAL ENCOUNTER (OUTPATIENT)
Dept: MAMMOGRAPHY | Age: 65
Discharge: HOME | End: 2025-05-16
Attending: INTERNAL MEDICINE
Payer: MEDICARE

## 2025-05-13 DIAGNOSIS — E21.0 PRIMARY HYPERPARATHYROIDISM: ICD-10-CM

## 2025-05-13 DIAGNOSIS — Z78.0 POSTMENOPAUSAL: ICD-10-CM

## 2025-05-13 PROCEDURE — 77080 DXA BONE DENSITY AXIAL: CPT

## 2025-05-22 ENCOUNTER — CARE COORDINATION (OUTPATIENT)
Dept: CARE COORDINATION | Facility: CLINIC | Age: 65
End: 2025-05-22

## 2025-05-22 NOTE — CARE COORDINATION
JACKIE CM to close program at this time.  Resource information has been emailed to pt's daughter and additional follow up information has been sent as well.  No additional JACKIE CM needs at this time.

## 2025-05-30 DIAGNOSIS — F20.0 PARANOID SCHIZOPHRENIA (HCC): ICD-10-CM

## 2025-05-30 DIAGNOSIS — F22 DELUSIONAL DISORDER (HCC): Primary | ICD-10-CM

## 2025-05-30 RX ORDER — PALIPERIDONE 6 MG/1
6 TABLET, EXTENDED RELEASE ORAL EVERY MORNING
COMMUNITY
End: 2025-05-30 | Stop reason: SDUPTHER

## 2025-05-30 RX ORDER — PALIPERIDONE 6 MG/1
6 TABLET, EXTENDED RELEASE ORAL EVERY MORNING
Qty: 30 TABLET | Refills: 1 | Status: SHIPPED | OUTPATIENT
Start: 2025-05-30

## 2025-05-30 NOTE — TELEPHONE ENCOUNTER
Mahad would like Dr. Kingsley to know that \"things are going really well. She has a lot more energy and is sleeping well. Rather than increasing to a higher dose (she is still on 6mg as that is what they had at home), would it make sense to keep everything status quo for now?\" If he is ok with 6mg dose, new Rx is needed to [facility].      Requested Prescriptions     Pending Prescriptions Disp Refills    paliperidone (INVEGA) 6 MG extended release tablet 30 tablet 1     Sig: Take 1 tablet by mouth every morning

## 2025-06-16 ENCOUNTER — TELEPHONE (OUTPATIENT)
Dept: BEHAVIORAL/MENTAL HEALTH CLINIC | Facility: CLINIC | Age: 65
End: 2025-06-16
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
## 2025-06-18 ENCOUNTER — HOSPITAL ENCOUNTER (OUTPATIENT)
Age: 65
Discharge: HOME | End: 2025-06-20
Payer: MEDICARE

## 2025-06-18 DIAGNOSIS — F20.0 PARANOID SCHIZOPHRENIA (HCC): Chronic | ICD-10-CM

## 2025-06-18 DIAGNOSIS — R41.3 MEMORY LOSS: Chronic | ICD-10-CM

## 2025-06-18 PROCEDURE — 70551 MRI BRAIN STEM W/O DYE: CPT

## 2025-06-19 ENCOUNTER — RESULTS FOLLOW-UP (OUTPATIENT)
Dept: INTERNAL MEDICINE CLINIC | Facility: CLINIC | Age: 65
End: 2025-06-19

## 2025-06-19 DIAGNOSIS — N32.81 OAB (OVERACTIVE BLADDER): ICD-10-CM

## 2025-06-19 RX ORDER — OXYBUTYNIN CHLORIDE 10 MG/1
10 TABLET, EXTENDED RELEASE ORAL DAILY
Qty: 90 TABLET | Refills: 1 | Status: SHIPPED | OUTPATIENT
Start: 2025-06-19

## 2025-06-20 ENCOUNTER — TELEPHONE (OUTPATIENT)
Dept: INTERNAL MEDICINE CLINIC | Facility: CLINIC | Age: 65
End: 2025-06-20

## 2025-06-21 ENCOUNTER — TELEPHONE (OUTPATIENT)
Dept: INTERNAL MEDICINE CLINIC | Facility: CLINIC | Age: 65
End: 2025-06-21

## 2025-06-23 ENCOUNTER — OFFICE VISIT (OUTPATIENT)
Dept: BEHAVIORAL/MENTAL HEALTH CLINIC | Facility: CLINIC | Age: 65
End: 2025-06-23
Payer: MEDICARE

## 2025-06-23 VITALS — HEIGHT: 62 IN | BODY MASS INDEX: 33.43 KG/M2

## 2025-06-23 DIAGNOSIS — F22 DELUSIONAL DISORDER (HCC): ICD-10-CM

## 2025-06-23 PROCEDURE — 90846 FAMILY PSYTX W/O PT 50 MIN: CPT | Performed by: PSYCHIATRY & NEUROLOGY

## 2025-06-23 PROCEDURE — 1123F ACP DISCUSS/DSCN MKR DOCD: CPT | Performed by: PSYCHIATRY & NEUROLOGY

## 2025-06-23 RX ORDER — PALIPERIDONE 9 MG/1
9 TABLET, EXTENDED RELEASE ORAL
Qty: 30 TABLET | Refills: 1 | Status: CANCELLED | OUTPATIENT
Start: 2025-06-23

## 2025-06-23 ASSESSMENT — PATIENT HEALTH QUESTIONNAIRE - PHQ9
SUM OF ALL RESPONSES TO PHQ QUESTIONS 1-9: 0
1. LITTLE INTEREST OR PLEASURE IN DOING THINGS: NOT AT ALL
2. FEELING DOWN, DEPRESSED OR HOPELESS: NOT AT ALL
SUM OF ALL RESPONSES TO PHQ QUESTIONS 1-9: 0

## 2025-06-23 NOTE — PROGRESS NOTES
verbalized understanding of this plan and agreed to it.  This note has been completed using [employer] Dict[employer] and while attempts have been made to ensure accuracy, certain words and phrases may not be transcribed as intended.   Time spend with in consultation with family was 34 minutes.

## 2025-08-08 ENCOUNTER — TELEPHONE (OUTPATIENT)
Dept: INTERNAL MEDICINE CLINIC | Facility: CLINIC | Age: 65
End: 2025-08-08